# Patient Record
Sex: MALE | Race: WHITE
[De-identification: names, ages, dates, MRNs, and addresses within clinical notes are randomized per-mention and may not be internally consistent; named-entity substitution may affect disease eponyms.]

---

## 2017-06-06 ENCOUNTER — HOSPITAL (OUTPATIENT)
Dept: OTHER | Age: 47
End: 2017-06-06
Attending: INTERNAL MEDICINE

## 2017-10-04 ENCOUNTER — CHARTING TRANS (OUTPATIENT)
Dept: OTHER | Age: 47
End: 2017-10-04

## 2017-11-10 ENCOUNTER — CHARTING TRANS (OUTPATIENT)
Dept: OTHER | Age: 47
End: 2017-11-10

## 2017-12-01 ENCOUNTER — CHARTING TRANS (OUTPATIENT)
Dept: OTHER | Age: 47
End: 2017-12-01

## 2017-12-01 ENCOUNTER — LAB SERVICES (OUTPATIENT)
Dept: OTHER | Age: 47
End: 2017-12-01

## 2017-12-04 ENCOUNTER — CHARTING TRANS (OUTPATIENT)
Dept: OTHER | Age: 47
End: 2017-12-04

## 2017-12-04 LAB
ALBUMIN SERPL-MCNC: 4.1 G/DL (ref 3.6–5.1)
ALBUMIN/GLOB SERPL: 1.2 (ref 1–2.4)
ALP SERPL-CCNC: 49 UNITS/L (ref 45–117)
ALT SERPL-CCNC: 42 UNITS/L
ANION GAP SERPL CALC-SCNC: 11 MMOL/L (ref 10–20)
AST SERPL-CCNC: 24 UNITS/L
BASOPHILS # BLD: 0 K/MCL (ref 0–0.3)
BASOPHILS NFR BLD: 0 %
BILIRUB SERPL-MCNC: 0.3 MG/DL (ref 0.2–1)
BUN SERPL-MCNC: 19 MG/DL (ref 6–20)
BUN/CREAT SERPL: 19 (ref 7–25)
CALCIUM SERPL-MCNC: 9.4 MG/DL (ref 8.4–10.2)
CHLORIDE SERPL-SCNC: 105 MMOL/L (ref 98–107)
CHOLEST SERPL-MCNC: 176 MG/DL
CHOLEST/HDLC SERPL: 4.1
CO2 SERPL-SCNC: 32 MMOL/L (ref 21–32)
CREAT SERPL-MCNC: 0.98 MG/DL (ref 0.67–1.17)
DIFFERENTIAL METHOD BLD: NORMAL
EOSINOPHIL # BLD: 0.3 K/MCL (ref 0.1–0.5)
EOSINOPHIL NFR BLD: 3 %
ERYTHROCYTE [DISTWIDTH] IN BLOOD: 13.9 % (ref 11–15)
GLOBULIN SER-MCNC: 3.4 G/DL (ref 2–4)
GLUCOSE SERPL-MCNC: 97 MG/DL (ref 65–99)
HDLC SERPL-MCNC: 43 MG/DL
HEMATOCRIT: 45.5 % (ref 39–51)
HEMOGLOBIN: 14.6 G/DL (ref 13–17)
LDLC SERPL CALC-MCNC: 115 MG/DL
LENGTH OF FAST TIME PATIENT: NORMAL HRS
LENGTH OF FAST TIME PATIENT: NORMAL HRS
LYMPHOCYTES # BLD: 3.3 K/MCL (ref 1–4.8)
LYMPHOCYTES NFR BLD: 36 %
MEAN CORPUSCULAR HEMOGLOBIN: 30.6 PG (ref 26–34)
MEAN CORPUSCULAR HGB CONC: 32.1 G/DL (ref 32–36.5)
MEAN CORPUSCULAR VOLUME: 95.4 FL (ref 78–100)
MONOCYTES # BLD: 0.7 K/MCL (ref 0.3–0.9)
MONOCYTES NFR BLD: 8 %
NEUTROPHILS # BLD: 4.7 K/MCL (ref 1.8–7.7)
NEUTROPHILS NFR BLD: 53 %
NONHDLC SERPL-MCNC: 133 MG/DL
PLATELET COUNT: 343 K/MCL (ref 140–450)
POTASSIUM SERPL-SCNC: 4.1 MMOL/L (ref 3.4–5.1)
RED CELL COUNT: 4.77 MIL/MCL (ref 4.5–5.9)
SODIUM SERPL-SCNC: 144 MMOL/L (ref 135–145)
TOTAL PROTEIN: 7.5 G/DL (ref 6.4–8.2)
TRIGL SERPL-MCNC: 89 MG/DL
TSH SERPL-ACNC: 1.61 MCUNITS/ML (ref 0.35–5)
WHITE BLOOD COUNT: 9 K/MCL (ref 4.2–11)

## 2018-05-23 ENCOUNTER — CHARTING TRANS (OUTPATIENT)
Dept: OTHER | Age: 48
End: 2018-05-23

## 2018-08-09 ENCOUNTER — CHARTING TRANS (OUTPATIENT)
Dept: OTHER | Age: 48
End: 2018-08-09

## 2018-08-15 ENCOUNTER — LAB SERVICES (OUTPATIENT)
Dept: OTHER | Age: 48
End: 2018-08-15

## 2018-08-15 ENCOUNTER — CHARTING TRANS (OUTPATIENT)
Dept: OTHER | Age: 48
End: 2018-08-15

## 2018-08-16 LAB
ALBUMIN SERPL-MCNC: 4.3 G/DL (ref 3.6–5.1)
ALBUMIN/GLOB SERPL: 1.4 (ref 1–2.4)
ALP SERPL-CCNC: 49 UNITS/L (ref 45–117)
ALT SERPL-CCNC: 40 UNITS/L
ANION GAP SERPL CALC-SCNC: 13 MMOL/L (ref 10–20)
AST SERPL-CCNC: 20 UNITS/L
BASOPHILS # BLD: 0 K/MCL (ref 0–0.3)
BASOPHILS NFR BLD: 0 %
BILIRUB SERPL-MCNC: 0.4 MG/DL (ref 0.2–1)
BUN SERPL-MCNC: 13 MG/DL (ref 6–20)
BUN/CREAT SERPL: 15 (ref 7–25)
CALCIUM SERPL-MCNC: 9.2 MG/DL (ref 8.4–10.2)
CHLORIDE SERPL-SCNC: 107 MMOL/L (ref 98–107)
CHOLEST SERPL-MCNC: 208 MG/DL
CHOLEST/HDLC SERPL: 5.9
CO2 SERPL-SCNC: 29 MMOL/L (ref 21–32)
CREAT SERPL-MCNC: 0.85 MG/DL (ref 0.67–1.17)
DIFFERENTIAL METHOD BLD: ABNORMAL
EOSINOPHIL # BLD: 0.2 K/MCL (ref 0.1–0.5)
EOSINOPHIL NFR BLD: 3 %
ERYTHROCYTE [DISTWIDTH] IN BLOOD: 14.1 % (ref 11–15)
GLOBULIN SER-MCNC: 3 G/DL (ref 2–4)
GLUCOSE SERPL-MCNC: 94 MG/DL (ref 65–99)
HDLC SERPL-MCNC: 35 MG/DL
HEMATOCRIT: 44.3 % (ref 39–51)
HEMOGLOBIN: 14.2 G/DL (ref 13–17)
IMM GRANULOCYTES # BLD AUTO: 0 K/MCL (ref 0–0.2)
IMM GRANULOCYTES NFR BLD: 0 %
LDLC SERPL CALC-MCNC: 126 MG/DL
LENGTH OF FAST TIME PATIENT: ABNORMAL HRS
LENGTH OF FAST TIME PATIENT: ABNORMAL HRS
LYMPHOCYTES # BLD: 2.8 K/MCL (ref 1–4.8)
LYMPHOCYTES NFR BLD: 41 %
MEAN CORPUSCULAR HEMOGLOBIN: 30.5 PG (ref 26–34)
MEAN CORPUSCULAR HGB CONC: 32.1 G/DL (ref 32–36.5)
MEAN CORPUSCULAR VOLUME: 95.3 FL (ref 78–100)
MONOCYTES # BLD: 0.7 K/MCL (ref 0.3–0.9)
MONOCYTES NFR BLD: 10 %
NEUTROPHILS # BLD: 3 K/MCL (ref 1.8–7.7)
NEUTROPHILS NFR BLD: 46 %
NONHDLC SERPL-MCNC: 173 MG/DL
NRBC (NRBCRE): 0 /100 WBC
PLATELET COUNT: 349 K/MCL (ref 140–450)
POTASSIUM SERPL-SCNC: 4.7 MMOL/L (ref 3.4–5.1)
PSA SERPL-MCNC: 1.06 NG/ML
RED CELL COUNT: 4.65 MIL/MCL (ref 4.5–5.9)
SODIUM SERPL-SCNC: 144 MMOL/L (ref 135–145)
TOTAL PROTEIN: 7.3 G/DL (ref 6.4–8.2)
TRIGL SERPL-MCNC: 236 MG/DL
TSH SERPL-ACNC: 1.88 MCUNITS/ML (ref 0.35–5)
WHITE BLOOD COUNT: 6.7 K/MCL (ref 4.2–11)

## 2018-10-31 VITALS
TEMPERATURE: 98.5 F | WEIGHT: 266 LBS | HEART RATE: 85 BPM | SYSTOLIC BLOOD PRESSURE: 138 MMHG | OXYGEN SATURATION: 95 % | HEIGHT: 68 IN | DIASTOLIC BLOOD PRESSURE: 82 MMHG | BODY MASS INDEX: 40.32 KG/M2

## 2018-11-01 VITALS
SYSTOLIC BLOOD PRESSURE: 137 MMHG | WEIGHT: 260.25 LBS | TEMPERATURE: 98.1 F | HEIGHT: 68 IN | HEART RATE: 72 BPM | DIASTOLIC BLOOD PRESSURE: 102 MMHG | OXYGEN SATURATION: 97 % | BODY MASS INDEX: 39.44 KG/M2

## 2018-11-02 VITALS
DIASTOLIC BLOOD PRESSURE: 84 MMHG | HEART RATE: 69 BPM | OXYGEN SATURATION: 95 % | BODY MASS INDEX: 38.06 KG/M2 | HEIGHT: 68 IN | SYSTOLIC BLOOD PRESSURE: 128 MMHG | WEIGHT: 251.1 LBS | TEMPERATURE: 98.1 F

## 2018-11-02 VITALS
WEIGHT: 248.24 LBS | SYSTOLIC BLOOD PRESSURE: 142 MMHG | OXYGEN SATURATION: 99 % | TEMPERATURE: 98.3 F | HEART RATE: 71 BPM | DIASTOLIC BLOOD PRESSURE: 91 MMHG

## 2018-11-02 VITALS
RESPIRATION RATE: 16 BRPM | DIASTOLIC BLOOD PRESSURE: 89 MMHG | SYSTOLIC BLOOD PRESSURE: 136 MMHG | HEIGHT: 68 IN | WEIGHT: 248.57 LBS | TEMPERATURE: 98.1 F | BODY MASS INDEX: 37.67 KG/M2 | HEART RATE: 62 BPM

## 2018-11-07 ENCOUNTER — CHARTING TRANS (OUTPATIENT)
Dept: OTHER | Age: 48
End: 2018-11-07

## 2018-11-07 ENCOUNTER — MYAURORA ACCOUNT LINK (OUTPATIENT)
Dept: OTHER | Age: 48
End: 2018-11-07

## 2018-11-27 VITALS
WEIGHT: 275 LBS | SYSTOLIC BLOOD PRESSURE: 124 MMHG | HEIGHT: 68 IN | TEMPERATURE: 98 F | HEART RATE: 68 BPM | DIASTOLIC BLOOD PRESSURE: 75 MMHG | BODY MASS INDEX: 41.68 KG/M2 | OXYGEN SATURATION: 98 %

## 2019-01-15 RX ORDER — BUPROPION HYDROCHLORIDE 300 MG/1
TABLET ORAL
Qty: 90 TABLET | Refills: 1 | Status: SHIPPED | OUTPATIENT
Start: 2019-01-15 | End: 2019-07-08 | Stop reason: SDUPTHER

## 2019-04-24 RX ORDER — OMEPRAZOLE 40 MG/1
CAPSULE, DELAYED RELEASE ORAL
Qty: 90 CAPSULE | Refills: 2 | Status: SHIPPED | OUTPATIENT
Start: 2019-04-24

## 2019-05-10 RX ORDER — DULOXETIN HYDROCHLORIDE 60 MG/1
CAPSULE, DELAYED RELEASE ORAL
Qty: 90 CAPSULE | Refills: 3 | Status: SHIPPED | OUTPATIENT
Start: 2019-05-10

## 2019-06-18 ENCOUNTER — HOSPITAL (OUTPATIENT)
Dept: OTHER | Age: 49
End: 2019-06-18
Attending: ORTHOPAEDIC SURGERY

## 2019-07-08 RX ORDER — BUPROPION HYDROCHLORIDE 300 MG/1
TABLET ORAL
Qty: 90 TABLET | Refills: 1 | Status: SHIPPED | OUTPATIENT
Start: 2019-07-08 | End: 2020-01-09 | Stop reason: SDUPTHER

## 2019-07-15 RX ORDER — GABAPENTIN 600 MG/1
TABLET ORAL
Qty: 90 TABLET | Refills: 3 | Status: SHIPPED | OUTPATIENT
Start: 2019-07-15 | End: 2020-07-07

## 2019-07-15 RX ORDER — LISINOPRIL 40 MG/1
TABLET ORAL
Qty: 90 TABLET | Refills: 3 | Status: SHIPPED | OUTPATIENT
Start: 2019-07-15 | End: 2020-07-07

## 2019-07-22 RX ORDER — AMLODIPINE BESYLATE 10 MG/1
TABLET ORAL
Qty: 90 TABLET | Refills: 3 | Status: SHIPPED | OUTPATIENT
Start: 2019-07-22

## 2019-07-23 RX ORDER — ROSUVASTATIN CALCIUM 5 MG/1
TABLET, COATED ORAL
Qty: 90 TABLET | Refills: 3 | Status: SHIPPED | OUTPATIENT
Start: 2019-07-23

## 2019-07-26 PROCEDURE — 93351 STRESS TTE COMPLETE: CPT | Performed by: INTERNAL MEDICINE

## 2019-08-28 RX ORDER — CARVEDILOL 12.5 MG/1
TABLET ORAL
Qty: 180 TABLET | Refills: 3 | Status: SHIPPED | OUTPATIENT
Start: 2019-08-28

## 2019-09-11 ENCOUNTER — HOSPITAL (OUTPATIENT)
Dept: OTHER | Age: 49
End: 2019-09-11
Attending: INTERNAL MEDICINE

## 2019-09-16 RX ORDER — HYDROCHLOROTHIAZIDE 25 MG/1
TABLET ORAL
Qty: 90 TABLET | Refills: 2 | Status: SHIPPED | OUTPATIENT
Start: 2019-09-16

## 2020-01-09 RX ORDER — BUPROPION HYDROCHLORIDE 300 MG/1
TABLET ORAL
Qty: 90 TABLET | Refills: 1 | Status: SHIPPED | OUTPATIENT
Start: 2020-01-09

## 2020-01-10 RX ORDER — OMEPRAZOLE 40 MG/1
CAPSULE, DELAYED RELEASE ORAL
Qty: 90 CAPSULE | Refills: 2 | OUTPATIENT
Start: 2020-01-10

## 2020-05-12 RX ORDER — DULOXETIN HYDROCHLORIDE 60 MG/1
CAPSULE, DELAYED RELEASE ORAL
Qty: 90 CAPSULE | Refills: 3 | OUTPATIENT
Start: 2020-05-12

## 2020-06-12 RX ORDER — HYDROCHLOROTHIAZIDE 25 MG/1
TABLET ORAL
Qty: 90 TABLET | Refills: 2 | OUTPATIENT
Start: 2020-06-12

## 2020-07-07 RX ORDER — GABAPENTIN 600 MG/1
TABLET ORAL
Qty: 90 TABLET | Refills: 3 | Status: SHIPPED | OUTPATIENT
Start: 2020-07-07

## 2020-07-07 RX ORDER — LISINOPRIL 40 MG/1
TABLET ORAL
Qty: 30 TABLET | Refills: 0 | Status: SHIPPED | OUTPATIENT
Start: 2020-07-07

## 2020-07-08 RX ORDER — BUPROPION HYDROCHLORIDE 300 MG/1
TABLET ORAL
Qty: 90 TABLET | Refills: 1 | OUTPATIENT
Start: 2020-07-08

## 2021-12-06 RX ORDER — GABAPENTIN 600 MG/1
600 TABLET ORAL DAILY
Qty: 90 TABLET | Refills: 3 | OUTPATIENT
Start: 2021-12-06

## 2024-03-13 ENCOUNTER — HOSPITAL ENCOUNTER (OUTPATIENT)
Dept: ULTRASOUND IMAGING | Age: 54
Discharge: HOME | End: 2024-03-13
Attending: INTERNAL MEDICINE
Payer: COMMERCIAL

## 2024-03-13 ENCOUNTER — OFFICE VISIT (OUTPATIENT)
Dept: ENDOCRINOLOGY CLINIC | Facility: CLINIC | Age: 54
End: 2024-03-13

## 2024-03-13 VITALS
WEIGHT: 275 LBS | SYSTOLIC BLOOD PRESSURE: 111 MMHG | HEART RATE: 69 BPM | DIASTOLIC BLOOD PRESSURE: 68 MMHG | HEIGHT: 69 IN | BODY MASS INDEX: 40.73 KG/M2

## 2024-03-13 DIAGNOSIS — Z86.39 HISTORY OF HYPOPARATHYROIDISM: ICD-10-CM

## 2024-03-13 DIAGNOSIS — E29.1 HYPOGONADISM IN MALE: ICD-10-CM

## 2024-03-13 DIAGNOSIS — E78.5 DYSLIPIDEMIA: ICD-10-CM

## 2024-03-13 DIAGNOSIS — R53.83 FATIGUE, UNSPECIFIED TYPE: ICD-10-CM

## 2024-03-13 DIAGNOSIS — E89.0 H/O PARTIAL THYROIDECTOMY: ICD-10-CM

## 2024-03-13 DIAGNOSIS — E89.0 H/O PARTIAL THYROIDECTOMY: Primary | ICD-10-CM

## 2024-03-13 DIAGNOSIS — R73.03 PRE-DIABETES: ICD-10-CM

## 2024-03-13 PROCEDURE — 99244 OFF/OP CNSLTJ NEW/EST MOD 40: CPT | Performed by: INTERNAL MEDICINE

## 2024-03-13 PROCEDURE — 76536 US EXAM OF HEAD AND NECK: CPT | Performed by: INTERNAL MEDICINE

## 2024-03-13 PROCEDURE — 3078F DIAST BP <80 MM HG: CPT | Performed by: INTERNAL MEDICINE

## 2024-03-13 PROCEDURE — 3008F BODY MASS INDEX DOCD: CPT | Performed by: INTERNAL MEDICINE

## 2024-03-13 PROCEDURE — 3074F SYST BP LT 130 MM HG: CPT | Performed by: INTERNAL MEDICINE

## 2024-03-13 RX ORDER — TESTOSTERONE 20.25 MG/1.25G
60.75 GEL TOPICAL
Qty: 5 EACH | Refills: 1 | Status: SHIPPED | OUTPATIENT
Start: 2024-03-13 | End: 2024-06-11

## 2024-03-13 RX ORDER — TESTOSTERONE 16.2 MG/G
3 GEL TRANSDERMAL DAILY
COMMUNITY
End: 2024-03-13

## 2024-03-13 NOTE — H&P
New Patient Evaluation - History and Physical    CONSULT - Reason for Visit:  Hypogonadism.  Requesting Physician: Self-Referral.    CHIEF COMPLAINT:    Chief Complaint   Patient presents with    Consult      Est care Testosterone, and weight management, last labs 6/2023         HISTORY OF PRESENT ILLNESS:   Srinivasa Andrade is a 53 year old male who presents with establishment of care for hypogonadism. He is currently taking 3 pumps of testosterone gel.     He still feels very tired and is concerned that the hypogonadism is not being treated appropriately. He thinks that this was diagnosed many years ago. He has 1 biological child who is 17 years old. He does not remember suffering any head trauma.     He does not remember having any pituitary labs checked.     He is also concerned about his weight. He states that he had been able to lose weight by eating healthy and exercising. He also states that his wife and son are not following the same eating habits so it is hard to eat this way long term.     He also has dyslipidemia for which he is taking treatment.     He also has a of multinodular goiter with history of partial thyroidectomy.      PAST MEDICAL HISTORY:   No past medical history on file.    PAST SURGICAL HISTORY:   No past surgical history on file.    SOCIAL HISTORY:    Social History     Socioeconomic History    Marital status:        FAMILY HISTORY:   No family history on file.    CURRENT MEDICATIONS:    Current Outpatient Medications   Medication Sig Dispense Refill    Testosterone 20.25 MG/ACT (1.62%) Transdermal Gel Apply 3 Pump topically daily.         ALLERGIES:  Not on File      ASSESSMENTS:     REVIEW OF SYSTEMS:  Constitutional: Negative for: weight change, fever, fatigue, cold/heat intolerance  Eyes: Negative for:  Visual changes, proptosis, blurring  ENT: Negative for:  dysphagia, neck swelling, dysphonia  Respiratory: Negative for:  dyspnea, cough  Cardiovascular: Negative for:  chest  pain, palpitations, orthopnea  GI: Negative for:  abdominal pain, nausea, vomiting, diarrhea, constipation, bleeding  Neurology: Negative for: headache, numbness, weakness  Genito-Urinary: Negative for: dysuria, frequency  Psychiatric: Negative for:  depression, anxiety  Hematology/Lymphatics: Negative for: bruising, lower extremity edema  Endocrine: Negative for: polyuria, polydypsia  Skin: Negative for: rash, blister, cellulitis,      PHYSICAL EXAM:   Vitals:    03/13/24 0956   BP: 111/68   Pulse: 69   Weight: 275 lb (124.7 kg)   Height: 5' 9\" (1.753 m)     BMI: Body mass index is 40.61 kg/m².     CONSTITUTIONAL:  awake, alert, cooperative, no apparent distress, and appears stated age  PSYCH: normal affect  EYES:  No proptosis, no ptosis, conjunctiva normal  ENT:  Normocephalic, atraumatic  NECK:  Supple, symmetrical, trachea midline, no adenopathy, thyroid symmetric, not enlarged and no tenderness  SKIN:  no bruising or bleeding, no rashes and no lesions, with mild acanthosis nigricans  EXTREMITIES: no edema      DATA:   Reviewed    ASSESSMENT AND PLAN: This is a 53 year-old man here for establishment of care of hypogonadism of unknown cause, obesity, pre-diabetes, dyslipidemia and vitamin D deficiency. He states that even though he is taking testosterone, this has not been helping him. I would like to recheck his testosterone and also evaluate him for his pituitary function, as well as other causes for fatigue.     I would also like to evaluate him for parameters that may adversely influence his weight, recheck lipid panel, and hemoglobin A1c.     We will also check thyroid US.    I will follow up with patient once test results are back.    Prior to this encounter, I spent over 20 minutes with preparing for the visit, reviewing documents from other specialties as well as from PCP, and going over test results and imaging studies. During the face to face encounter, I spent an additional 30 minutes which were  determined for history-taking, physical examination, and orientation regarding our services. Greater than 50% of the time was spent in counseling, anticipatory guidance, and coordination of care. Patient concerns were answered to the best of my knowledge.         3/13/2024  Marlen Matta MD

## 2024-04-09 ENCOUNTER — LAB ENCOUNTER (OUTPATIENT)
Dept: LAB | Facility: REFERENCE LAB | Age: 54
End: 2024-04-09
Attending: INTERNAL MEDICINE
Payer: COMMERCIAL

## 2024-04-09 DIAGNOSIS — E29.1 HYPOGONADISM IN MALE: ICD-10-CM

## 2024-04-09 DIAGNOSIS — R53.83 FATIGUE, UNSPECIFIED TYPE: ICD-10-CM

## 2024-04-09 DIAGNOSIS — E78.5 DYSLIPIDEMIA: ICD-10-CM

## 2024-04-09 DIAGNOSIS — E89.0 H/O PARTIAL THYROIDECTOMY: ICD-10-CM

## 2024-04-09 DIAGNOSIS — R73.03 PRE-DIABETES: ICD-10-CM

## 2024-04-09 DIAGNOSIS — Z86.39 HISTORY OF HYPOPARATHYROIDISM: ICD-10-CM

## 2024-04-09 LAB
ALBUMIN SERPL-MCNC: 4.8 G/DL (ref 3.2–4.8)
ALBUMIN/GLOB SERPL: 1.7 {RATIO} (ref 1–2)
ALP LIVER SERPL-CCNC: 59 U/L
ALT SERPL-CCNC: 33 U/L
ANION GAP SERPL CALC-SCNC: 7 MMOL/L (ref 0–18)
AST SERPL-CCNC: 24 U/L (ref ?–34)
BASOPHILS # BLD AUTO: 0.05 X10(3) UL (ref 0–0.2)
BASOPHILS NFR BLD AUTO: 0.6 %
BILIRUB SERPL-MCNC: 0.6 MG/DL (ref 0.3–1.2)
BUN BLD-MCNC: 18 MG/DL (ref 9–23)
BUN/CREAT SERPL: 16.8 (ref 10–20)
CALCIUM BLD-MCNC: 9.6 MG/DL (ref 8.7–10.4)
CHLORIDE SERPL-SCNC: 107 MMOL/L (ref 98–112)
CHOLEST SERPL-MCNC: 205 MG/DL (ref ?–200)
CO2 SERPL-SCNC: 29 MMOL/L (ref 21–32)
CORTIS SERPL-MCNC: 14.2 UG/DL
CREAT BLD-MCNC: 1.07 MG/DL
DEPRECATED RDW RBC AUTO: 46.7 FL (ref 35.1–46.3)
EGFRCR SERPLBLD CKD-EPI 2021: 82 ML/MIN/1.73M2 (ref 60–?)
EOSINOPHIL # BLD AUTO: 0.29 X10(3) UL (ref 0–0.7)
EOSINOPHIL NFR BLD AUTO: 3.5 %
ERYTHROCYTE [DISTWIDTH] IN BLOOD BY AUTOMATED COUNT: 14.2 % (ref 11–15)
EST. AVERAGE GLUCOSE BLD GHB EST-MCNC: 120 MG/DL (ref 68–126)
FASTING PATIENT LIPID ANSWER: YES
FASTING STATUS PATIENT QL REPORTED: YES
FSH SERPL-ACNC: 5.7 MIU/ML
GLOBULIN PLAS-MCNC: 2.9 G/DL (ref 2.8–4.4)
GLUCOSE BLD-MCNC: 111 MG/DL (ref 70–99)
HBA1C MFR BLD: 5.8 % (ref ?–5.7)
HCT VFR BLD AUTO: 44.5 %
HDLC SERPL-MCNC: 39 MG/DL (ref 40–59)
HGB BLD-MCNC: 15.5 G/DL
IMM GRANULOCYTES # BLD AUTO: 0.02 X10(3) UL (ref 0–1)
IMM GRANULOCYTES NFR BLD: 0.2 %
INSULIN SERPL-ACNC: 21.5 MU/L (ref 3–25)
LDLC SERPL CALC-MCNC: 129 MG/DL (ref ?–100)
LH SERPL-ACNC: 4.3 MIU/ML
LYMPHOCYTES # BLD AUTO: 3.66 X10(3) UL (ref 1–4)
LYMPHOCYTES NFR BLD AUTO: 43.8 %
MCH RBC QN AUTO: 31.4 PG (ref 26–34)
MCHC RBC AUTO-ENTMCNC: 34.8 G/DL (ref 31–37)
MCV RBC AUTO: 90.1 FL
MONOCYTES # BLD AUTO: 0.71 X10(3) UL (ref 0.1–1)
MONOCYTES NFR BLD AUTO: 8.5 %
NEUTROPHILS # BLD AUTO: 3.62 X10 (3) UL (ref 1.5–7.7)
NEUTROPHILS # BLD AUTO: 3.62 X10(3) UL (ref 1.5–7.7)
NEUTROPHILS NFR BLD AUTO: 43.4 %
NONHDLC SERPL-MCNC: 166 MG/DL (ref ?–130)
OSMOLALITY SERPL CALC.SUM OF ELEC: 299 MOSM/KG (ref 275–295)
PLATELET # BLD AUTO: 387 10(3)UL (ref 150–450)
POTASSIUM SERPL-SCNC: 3.7 MMOL/L (ref 3.5–5.1)
PROLACTIN SERPL-MCNC: 8.5 NG/ML
PROT SERPL-MCNC: 7.7 G/DL (ref 5.7–8.2)
PTH-INTACT SERPL-MCNC: 94.5 PG/ML (ref 18.5–88)
RBC # BLD AUTO: 4.94 X10(6)UL
SODIUM SERPL-SCNC: 143 MMOL/L (ref 136–145)
T3FREE SERPL-MCNC: 2.43 PG/ML (ref 2.4–4.2)
T4 FREE SERPL-MCNC: 1.1 NG/DL (ref 0.8–1.7)
TRIGL SERPL-MCNC: 205 MG/DL (ref 30–149)
TSI SER-ACNC: 2.33 MIU/ML (ref 0.55–4.78)
VIT B12 SERPL-MCNC: 443 PG/ML (ref 211–911)
VIT D+METAB SERPL-MCNC: 39.3 NG/ML (ref 30–100)
VLDLC SERPL CALC-MCNC: 37 MG/DL (ref 0–30)
WBC # BLD AUTO: 8.4 X10(3) UL (ref 4–11)

## 2024-04-09 PROCEDURE — 80061 LIPID PANEL: CPT

## 2024-04-09 PROCEDURE — 85025 COMPLETE CBC W/AUTO DIFF WBC: CPT

## 2024-04-09 PROCEDURE — 80053 COMPREHEN METABOLIC PANEL: CPT

## 2024-04-09 PROCEDURE — 82607 VITAMIN B-12: CPT

## 2024-04-09 PROCEDURE — 84146 ASSAY OF PROLACTIN: CPT

## 2024-04-09 PROCEDURE — 83525 ASSAY OF INSULIN: CPT

## 2024-04-09 PROCEDURE — 82306 VITAMIN D 25 HYDROXY: CPT

## 2024-04-09 PROCEDURE — 82024 ASSAY OF ACTH: CPT

## 2024-04-09 PROCEDURE — 83002 ASSAY OF GONADOTROPIN (LH): CPT

## 2024-04-09 PROCEDURE — 84443 ASSAY THYROID STIM HORMONE: CPT

## 2024-04-09 PROCEDURE — 83036 HEMOGLOBIN GLYCOSYLATED A1C: CPT

## 2024-04-09 PROCEDURE — 84439 ASSAY OF FREE THYROXINE: CPT

## 2024-04-09 PROCEDURE — 84305 ASSAY OF SOMATOMEDIN: CPT

## 2024-04-09 PROCEDURE — 83001 ASSAY OF GONADOTROPIN (FSH): CPT

## 2024-04-09 PROCEDURE — 84410 TESTOSTERONE BIOAVAILABLE: CPT

## 2024-04-09 PROCEDURE — 83970 ASSAY OF PARATHORMONE: CPT

## 2024-04-09 PROCEDURE — 36415 COLL VENOUS BLD VENIPUNCTURE: CPT

## 2024-04-09 PROCEDURE — 84481 FREE ASSAY (FT-3): CPT

## 2024-04-09 PROCEDURE — 82533 TOTAL CORTISOL: CPT

## 2024-04-10 LAB — ACTH: 14.1 PG/ML

## 2024-04-11 LAB
IGF I: 196 NG/ML
IGF-1, Z SCORE: 0.9 S.D.

## 2024-04-12 LAB
SEX HORM BIND GLOB: 25.9 NMOL/L
TESTOST % FREE+WEAK BND: 28.5 %
TESTOST FREE+WEAK BND: 82.1 NG/DL
TESTOSTERONE TOT /MS: 288 NG/DL

## 2024-04-15 NOTE — PROGRESS NOTES
Follow-up- Reason for Visit:  Hypogonadism.  Requesting Physician: Self-Referral.    CHIEF COMPLAINT:    Chief Complaint   Patient presents with    Thyroid Problem     Follow up labs, ultrasound        HISTORY OF PRESENT ILLNESS:   Srinivasa Andrade is a 53 year old male who presents for follow-up of hypogonadism. He is currently taking 3 pumps of testosterone gel.     At the last initial visit, he was feeling very tired and was concerned that the hypogonadism was not being treated appropriately. He thinks that this was diagnosed many years ago. He has 1 biological child who is 17 years old. He does not remember suffering any head trauma.     He did not remember having any pituitary labs checked.     He is also concerned about his weight. He states that he had been able to lose weight by eating healthy and exercising. He also states that his wife and son are not following the same eating habits so it is hard to eat this way long term.     He also has dyslipidemia for which he is taking treatment (rosuvastatin).    He also has a of multinodular goiter with history of partial thyroidectomy in 2007 and 2008. He also has had a parathyroidectomy.     I explained to him at that visit, that we would complete a full evaluation and then make recommendations based upon the test results.     PAST MEDICAL HISTORY:   No past medical history on file.    PAST SURGICAL HISTORY:   No past surgical history on file.    SOCIAL HISTORY:    Social History     Socioeconomic History    Marital status:        FAMILY HISTORY:   No family history on file.    CURRENT MEDICATIONS:    Current Outpatient Medications   Medication Sig Dispense Refill    Testosterone 20.25 MG/1.25GM (1.62%) Transdermal Gel Place 60.75 mg onto the skin once daily. 5 each 1       ALLERGIES:  Not on File      ASSESSMENTS:     REVIEW OF SYSTEMS:  Constitutional: Negative for: weight change, fever, fatigue, cold/heat intolerance  Eyes: Negative for:  Visual changes,  proptosis, blurring  ENT: Negative for:  dysphagia, neck swelling, dysphonia  Respiratory: Negative for:  dyspnea, cough  Cardiovascular: Negative for:  chest pain, palpitations, orthopnea  GI: Negative for:  abdominal pain, nausea, vomiting, diarrhea, constipation, bleeding  Neurology: Negative for: headache, numbness, weakness  Genito-Urinary: Negative for: dysuria, frequency  Psychiatric: Negative for:  depression, anxiety  Hematology/Lymphatics: Negative for: bruising, lower extremity edema  Endocrine: Negative for: polyuria, polydypsia  Skin: Negative for: rash, blister, cellulitis,      PHYSICAL EXAM:   Vitals:    04/16/24 0924   BP: 114/65   Pulse: 72   Weight: 283 lb 9.6 oz (128.6 kg)   Height: 5' 9\" (1.753 m)       BMI: Body mass index is 41.88 kg/m².     CONSTITUTIONAL:  awake, alert, cooperative, no apparent distress, and appears stated age  PSYCH: normal affect  EYES:  No proptosis, no ptosis, conjunctiva normal  ENT:  Normocephalic, atraumatic  NECK:  Supple, symmetrical, trachea midline, no adenopathy, thyroid symmetric, not enlarged and no tenderness  SKIN:  no bruising or bleeding, no rashes and no lesions, with mild acanthosis nigricans  EXTREMITIES: no edema      DATA:   Reviewed    Thyroid US- 3/13/24:  PROCEDURE: US THYROID (CPT= 53025)     COMPARISON: None.     INDICATIONS: H/O partial thyroidectomy     TECHNIQUE: High-resolution ultrasound was performed of the thyroid gland.     FINDINGS:  RIGHT LOBE: Right lobe measures 54 x 27 x 21 mm (14.3 mL).  Heterogeneous echotexture.  In the posterior lower pole there is a 10 x 8 x 8 mm solid hypoechoic nodule (TR 4).     LEFT LOBE: Post partial thyroidectomy.  Unremarkable left thyroid bed.     ISTHMUS: Isthmus measures 7 mm in anterior posterior diameter.  Heterogeneous echotexture without nodule.     OTHER: No masses or adenopathy.                 Impression   CONCLUSION:  1.  Post resection of the left lobe of the thyroid.  No suspicious soft tissue  in the left thyroid bed.  2.  Heterogeneous appearance of the thyroid parenchyma compatible with a nonspecific thyroiditis.  3.  10 mm TIRADS 4 nodule in the right lobe.  Given its current size recommend follow-up ultrasound in 12 months.        Dictated by (CST): Jose Panchal MD on 3/13/2024 at 2:38 PM      Finalized by (CST): Jose Panchal MD on 3/13/2024 at 2:39 PM             ASSESSMENT AND PLAN: This is a 53 year-old man here for follow-up of hypogonadism of unknown cause, obesity, pre-diabetes, dyslipidemia and vitamin D deficiency.     1.) Hypogonadism  - Stop transdermal testosterone   - Start IM testosterone, 150mg every 2 weeks for 3 months  - We will check testosterone, CBC with diff and PSA after 3 months    2.) Vitamin Levels  - Continue Vitamin D  - Start taking Vitamin B12 500mcg daily    3.) Secondary Hyperparathyroidism  - Could be from low calcium  - He should start taking calcium citrate + D once a day  - We will recheck in 3 months    4.) Dyslipidemia  - We will change rosuvastatin to atorvastatin   - Recheck Lipid panel in 3 months    5.) Obesity and increased cravings and pre-diabetes  - Start phentermine  - Start Ozempic 0.25mg qweekly  - Check HbA1c in 3 months    Return in 3 months    Prior to this encounter, I spent over 15 minutes with preparing for the visit, including reviewing documents from other specialties as well as from PCP and going over test results and imaging studies. During the face to face encounter, I spent an additional 15 minutes which were determined for follow-up. Greater than 50% of the time was spent in counseling, anticipatory guidance, and coordination of care. Patient concerns were answered to the best of my knowledge.         4/16/24  Marlen Matta MD

## 2024-04-16 ENCOUNTER — OFFICE VISIT (OUTPATIENT)
Dept: ENDOCRINOLOGY CLINIC | Facility: CLINIC | Age: 54
End: 2024-04-16
Payer: COMMERCIAL

## 2024-04-16 VITALS
SYSTOLIC BLOOD PRESSURE: 114 MMHG | BODY MASS INDEX: 42.01 KG/M2 | DIASTOLIC BLOOD PRESSURE: 65 MMHG | WEIGHT: 283.63 LBS | HEART RATE: 72 BPM | HEIGHT: 69 IN

## 2024-04-16 DIAGNOSIS — E29.1 HYPOGONADISM IN MALE: Primary | ICD-10-CM

## 2024-04-16 DIAGNOSIS — E78.5 DYSLIPIDEMIA: ICD-10-CM

## 2024-04-16 DIAGNOSIS — E53.8 VITAMIN B12 DEFICIENCY: ICD-10-CM

## 2024-04-16 DIAGNOSIS — E88.819 INSULIN RESISTANCE: ICD-10-CM

## 2024-04-16 DIAGNOSIS — N25.81 SECONDARY HYPERPARATHYROIDISM (HCC): ICD-10-CM

## 2024-04-16 DIAGNOSIS — Z86.39 HISTORY OF HYPOPARATHYROIDISM: ICD-10-CM

## 2024-04-16 DIAGNOSIS — E66.01 CLASS 3 SEVERE OBESITY DUE TO EXCESS CALORIES WITH SERIOUS COMORBIDITY AND BODY MASS INDEX (BMI) OF 40.0 TO 44.9 IN ADULT (HCC): ICD-10-CM

## 2024-04-16 PROCEDURE — 96372 THER/PROPH/DIAG INJ SC/IM: CPT | Performed by: INTERNAL MEDICINE

## 2024-04-16 PROCEDURE — 99214 OFFICE O/P EST MOD 30 MIN: CPT | Performed by: INTERNAL MEDICINE

## 2024-04-16 PROCEDURE — 3008F BODY MASS INDEX DOCD: CPT | Performed by: INTERNAL MEDICINE

## 2024-04-16 PROCEDURE — 3078F DIAST BP <80 MM HG: CPT | Performed by: INTERNAL MEDICINE

## 2024-04-16 PROCEDURE — 3074F SYST BP LT 130 MM HG: CPT | Performed by: INTERNAL MEDICINE

## 2024-04-16 RX ORDER — TESTOSTERONE CYPIONATE 200 MG/ML
150 INJECTION, SOLUTION INTRAMUSCULAR ONCE
Status: COMPLETED | OUTPATIENT
Start: 2024-04-16 | End: 2024-04-16

## 2024-04-16 RX ADMIN — TESTOSTERONE CYPIONATE 150 MG: 200 INJECTION, SOLUTION INTRAMUSCULAR at 10:03:00

## 2024-04-16 NOTE — PROGRESS NOTES
Patient was at clinic for OV with Dr. Matta - Dr. Matta would like patient to transition to testosterone injections - wife will administer  Dose of 150mg testosterone drawn up - administration reviewed with patient and wife - wife stated understanding and gave injection to patient's left deltiod  Patient tolerated well  All questions answered and patient and wife stated understanding  Patient's wife will administer next injection in 2 weeks   Patient left in stable condition

## 2024-04-17 PROBLEM — E53.8 VITAMIN B12 DEFICIENCY: Status: ACTIVE | Noted: 2024-04-17

## 2024-04-17 PROBLEM — N25.81 SECONDARY HYPERPARATHYROIDISM (HCC): Status: ACTIVE | Noted: 2024-04-17

## 2024-04-17 PROBLEM — E88.819 INSULIN RESISTANCE: Status: ACTIVE | Noted: 2024-04-17

## 2024-04-17 PROBLEM — E66.813 CLASS 3 SEVERE OBESITY DUE TO EXCESS CALORIES WITH SERIOUS COMORBIDITY AND BODY MASS INDEX (BMI) OF 40.0 TO 44.9 IN ADULT (HCC): Status: ACTIVE | Noted: 2024-04-17

## 2024-04-17 PROBLEM — E66.813 CLASS 3 SEVERE OBESITY DUE TO EXCESS CALORIES WITH SERIOUS COMORBIDITY AND BODY MASS INDEX (BMI) OF 40.0 TO 44.9 IN ADULT: Status: ACTIVE | Noted: 2024-04-17

## 2024-04-17 PROBLEM — E66.01 CLASS 3 SEVERE OBESITY DUE TO EXCESS CALORIES WITH SERIOUS COMORBIDITY AND BODY MASS INDEX (BMI) OF 40.0 TO 44.9 IN ADULT (HCC): Status: ACTIVE | Noted: 2024-04-17

## 2024-04-17 RX ORDER — PHENTERMINE HYDROCHLORIDE 37.5 MG/1
18.75 TABLET ORAL
Qty: 15 TABLET | Refills: 0 | Status: SHIPPED | OUTPATIENT
Start: 2024-04-17 | End: 2024-05-17

## 2024-04-17 RX ORDER — ATORVASTATIN CALCIUM 20 MG/1
20 TABLET, FILM COATED ORAL NIGHTLY
Qty: 90 TABLET | Refills: 0 | Status: SHIPPED | OUTPATIENT
Start: 2024-04-17 | End: 2024-07-16

## 2024-04-17 RX ORDER — SEMAGLUTIDE 0.68 MG/ML
INJECTION, SOLUTION SUBCUTANEOUS
Qty: 3 ML | Refills: 0 | Status: SHIPPED | OUTPATIENT
Start: 2024-04-17 | End: 2024-06-01

## 2024-04-19 ENCOUNTER — TELEPHONE (OUTPATIENT)
Dept: ENDOCRINOLOGY CLINIC | Facility: CLINIC | Age: 54
End: 2024-04-19

## 2024-04-19 NOTE — TELEPHONE ENCOUNTER
Current Outpatient Medications   Medication Sig Dispense Refill    Phentermine HCl 37.5 MG Oral Tab Take 0.5 tablets (18.75 mg total) by mouth every morning before breakfast. 15 tablet 0     Key # [id number]

## 2024-04-20 NOTE — TELEPHONE ENCOUNTER
Medication PA Requested:    Phentermine 37.5mg oral tab                                                      CoverMyMeds Used:  Key:  Quantity: 15  Day Supply: 30  Sig:   Take 0.5 tablets (18.75 mg total) by mouth every morning before breakfast.   DX Code:    E66.01

## 2024-04-22 ENCOUNTER — TELEPHONE (OUTPATIENT)
Dept: ENDOCRINOLOGY CLINIC | Facility: CLINIC | Age: 54
End: 2024-04-22

## 2024-04-22 DIAGNOSIS — E29.1 HYPOGONADISM IN MALE: Primary | ICD-10-CM

## 2024-04-22 NOTE — TELEPHONE ENCOUNTER
Medication PA Requested:    Phentermine 37.5mg oral tab                                                      CoverMyMeds Used: No  Key:  Quantity: 15  Day Supply: 30  Sig:   Take 0.5 tablets (18.75 mg total) by mouth every morning before breakfast.   DX Code:    E66.01          EPA submitted, LOV 4/16  Awaiting determination

## 2024-04-24 NOTE — TELEPHONE ENCOUNTER
Received fax from [facility] in regards to Phentermine. Medication has been approved from 03/23/2024 to 10/19/2024. [employer] message sent to pt and approval letter sent to scanning.

## 2024-04-26 NOTE — TELEPHONE ENCOUNTER
Spoke with patient and he needs testosterone Rx sent to the pharmacy. Pended testosterone, needles, and syringes.     Dr. Sullivan, can you send in on behalf of Dr. Matta?

## 2024-04-27 RX ORDER — SYRINGE W-NEEDLE,DISPOSAB,3 ML 25GX5/8"
SYRINGE, EMPTY DISPOSABLE MISCELLANEOUS
Qty: 50 EACH | Refills: 0 | Status: SHIPPED | OUTPATIENT
Start: 2024-04-27

## 2024-04-27 RX ORDER — TESTOSTERONE CYPIONATE 200 MG/ML
150 INJECTION, SOLUTION INTRAMUSCULAR
Qty: 2 ML | Refills: 1 | Status: SHIPPED | OUTPATIENT
Start: 2024-04-27

## 2024-05-06 ENCOUNTER — TELEPHONE (OUTPATIENT)
Dept: ENDOCRINOLOGY CLINIC | Facility: CLINIC | Age: 54
End: 2024-05-06

## 2024-05-06 NOTE — TELEPHONE ENCOUNTER
Patient calling for status on PRIOR AUTHORIZATION for Testosterone.  He states that insurance should have sent a fax regarding the this.  Please call    See 4/22/24 telephone encounter

## 2024-05-07 ENCOUNTER — NURSE ONLY (OUTPATIENT)
Dept: ENDOCRINOLOGY CLINIC | Facility: CLINIC | Age: 54
End: 2024-05-07

## 2024-05-07 DIAGNOSIS — E29.1 HYPOGONADISM IN MALE: Primary | ICD-10-CM

## 2024-05-07 PROCEDURE — 96372 THER/PROPH/DIAG INJ SC/IM: CPT | Performed by: INTERNAL MEDICINE

## 2024-05-07 RX ORDER — TESTOSTERONE CYPIONATE 200 MG/ML
150 INJECTION, SOLUTION INTRAMUSCULAR ONCE
Status: COMPLETED | OUTPATIENT
Start: 2024-05-07 | End: 2024-05-07

## 2024-05-07 RX ADMIN — TESTOSTERONE CYPIONATE 150 MG: 200 INJECTION, SOLUTION INTRAMUSCULAR at 15:17:00

## 2024-05-07 NOTE — TELEPHONE ENCOUNTER
Patient calling with spouse on speaker.  Patient states that he spoke with insurance and was informed that MD office needs to send the Authorization to the pharmacy.      Patient wife states that this process has been going on since he left office for his last OV.  He stresses that he doesn't understand what is delaying office from getting approval.      Patient is currently behind 2 injections.  He is requesting to know if he can schedule a NV appointment in [place] to have the injection.      Patient is requesting to be called back today.

## 2024-05-07 NOTE — TELEPHONE ENCOUNTER
Medication PA Requested:  testosterone cypionate 200 mg/mL Intramuscular Solution                                                         CoverMyMeds Used:  Key:  Quantity: 2mL  Day Supply: 30  Sig: Inject 0.75 mL (150 mg total) into the muscle every 14 (fourteen) days.   DX Code:    E29.1

## 2024-05-07 NOTE — TELEPHONE ENCOUNTER
Medication PA Requested:  testosterone cypionate 200 mg/mL Intramuscular Solution                                                         CoverMyMeds Used: No  Key:  Quantity: 2mL  Day Supply: 30  Sig: Inject 0.75 mL (150 mg total) into the muscle every 14 (fourteen) days.   DX Code:    E29.1                                         Electronic Prior Authorization submitted, Last office visit notes 4/16/24 and labs 4/9/24 and 6/7/23.  Awaiting determination

## 2024-05-07 NOTE — TELEPHONE ENCOUNTER
Called pt and notified that PA has been submitted and once we get a response, pt will be notified.   Pt requesting to receive testosterone asap. Offered NV today at 2 pm for testosterone injection per Sabrina. Pt agreed and will come to appt.

## 2024-05-07 NOTE — PROGRESS NOTES
Patient presents to clinic for testosterone injection.   Last CBC level date: 04/09/2024  Last testosterone level date: 04/09/2024  Dose and MAR order verified by: Shelli KANG, patient wife administered injection per patient request.  Tolerated 150 mg injection to right deltoid.   Patient understands plan of care, patient wife will be administering the next injection in 2  weeks. Total of 10 minutes spent with patient administering injection and discussing plan of care.   50 mg waste medication disposed of in purple top waste medication bin per clinic policy.   Other task reminders given to the patient: Patient wife was educated on how to draw the injection, pick the correct site and administer the injection.

## 2024-05-09 NOTE — TELEPHONE ENCOUNTER
Testosterone cypionate approved from 4/7/24-11/3/24    "[employer] message sent to patient

## 2024-07-16 ENCOUNTER — OFFICE VISIT (OUTPATIENT)
Dept: ENDOCRINOLOGY CLINIC | Facility: CLINIC | Age: 54
End: 2024-07-16
Payer: COMMERCIAL

## 2024-07-16 VITALS
WEIGHT: 290 LBS | BODY MASS INDEX: 42.95 KG/M2 | HEART RATE: 72 BPM | HEIGHT: 69 IN | DIASTOLIC BLOOD PRESSURE: 65 MMHG | SYSTOLIC BLOOD PRESSURE: 104 MMHG

## 2024-07-16 DIAGNOSIS — E78.5 DYSLIPIDEMIA: ICD-10-CM

## 2024-07-16 DIAGNOSIS — E88.819 INSULIN RESISTANCE: ICD-10-CM

## 2024-07-16 DIAGNOSIS — E66.01 CLASS 3 SEVERE OBESITY DUE TO EXCESS CALORIES WITH SERIOUS COMORBIDITY AND BODY MASS INDEX (BMI) OF 40.0 TO 44.9 IN ADULT (HCC): ICD-10-CM

## 2024-07-16 DIAGNOSIS — N25.81 SECONDARY HYPERPARATHYROIDISM (HCC): ICD-10-CM

## 2024-07-16 DIAGNOSIS — E66.01 CLASS 3 SEVERE OBESITY DUE TO EXCESS CALORIES WITH SERIOUS COMORBIDITY AND BODY MASS INDEX (BMI) OF 40.0 TO 44.9 IN ADULT (HCC): Primary | ICD-10-CM

## 2024-07-16 DIAGNOSIS — R73.03 PRE-DIABETES: ICD-10-CM

## 2024-07-16 PROCEDURE — 99214 OFFICE O/P EST MOD 30 MIN: CPT | Performed by: INTERNAL MEDICINE

## 2024-07-16 PROCEDURE — 3074F SYST BP LT 130 MM HG: CPT | Performed by: INTERNAL MEDICINE

## 2024-07-16 PROCEDURE — 3078F DIAST BP <80 MM HG: CPT | Performed by: INTERNAL MEDICINE

## 2024-07-16 PROCEDURE — 3008F BODY MASS INDEX DOCD: CPT | Performed by: INTERNAL MEDICINE

## 2024-07-16 RX ORDER — SEMAGLUTIDE 1.34 MG/ML
1 INJECTION, SOLUTION SUBCUTANEOUS WEEKLY
Qty: 9 ML | Refills: 0 | Status: SHIPPED | OUTPATIENT
Start: 2024-07-16 | End: 2024-10-14

## 2024-07-16 NOTE — PROGRESS NOTES
Follow-up- Reason for Visit:  Hypogonadism.  Requesting Physician: Self-Referral.    CHIEF COMPLAINT:    Chief Complaint   Patient presents with    Hypogonadism    Thyroid Problem     Hyperparathyroidism         HISTORY OF PRESENT ILLNESS:   Srinivasa Andrade is a 54 year old male who presents for follow-up of hypogonadism.     At the last visit, we had started him on testosterone injections, as well as phentermine, and Ozempic and I had also asked him to increase calcium as well as vitamin D. He is here in follow up.     He is still feeling tired and states that a couple of days before his injection, he feels tired. He is not taking his phentermine that I prescribed, nor has he asked for refill for Ozempic after starting the lowest dose.     PAST MEDICAL HISTORY:   No past medical history on file.    PAST SURGICAL HISTORY:   No past surgical history on file.    SOCIAL HISTORY:    Social History     Socioeconomic History    Marital status:    Tobacco Use    Smoking status: Some Days     Types: Cigars   Vaping Use    Vaping status: Former       FAMILY HISTORY:   No family history on file.    CURRENT MEDICATIONS:    Current Outpatient Medications   Medication Sig Dispense Refill    testosterone cypionate 200 mg/mL Intramuscular Solution Inject 0.75 mL (150 mg total) into the muscle every 14 (fourteen) days. 2 mL 1    atorvastatin 20 MG Oral Tab Take 1 tablet (20 mg total) by mouth nightly. 90 tablet 0    Syringe 23G X 1\" 3 ML Does not apply Misc Use to inject medication every 14 days 50 each 0    Needle, Disp, 18G X 1\" Does not apply Misc Use to inject medication every 14 days 50 each 0    semaglutide (OZEMPIC, 0.25 OR 0.5 MG/DOSE,) 2 MG/3ML Subcutaneous Solution Pen-injector Inject 0.25 mg into the skin once a week for 30 days, THEN 0.5 mg once a week for 15 days. 3 mL 0       ALLERGIES:  No Known Allergies      ASSESSMENTS:     REVIEW OF SYSTEMS:  Constitutional: Negative for: weight change, fever, fatigue,  cold/heat intolerance  Eyes: Negative for:  Visual changes, proptosis, blurring  ENT: Negative for:  dysphagia, neck swelling, dysphonia  Respiratory: Negative for:  dyspnea, cough  Cardiovascular: Negative for:  chest pain, palpitations, orthopnea  GI: Negative for:  abdominal pain, nausea, vomiting, diarrhea, constipation, bleeding  Neurology: Negative for: headache, numbness, weakness  Genito-Urinary: Negative for: dysuria, frequency  Psychiatric: Negative for:  depression, anxiety  Hematology/Lymphatics: Negative for: bruising, lower extremity edema  Endocrine: Negative for: polyuria, polydypsia  Skin: Negative for: rash, blister, cellulitis,      PHYSICAL EXAM:   Vitals:    07/16/24 0849   BP: 104/65   Pulse: 72   Weight: 290 lb (131.5 kg)   Height: 5' 9\" (1.753 m)         BMI: Body mass index is 42.83 kg/m².     CONSTITUTIONAL:  awake, alert, cooperative, no apparent distress, and appears stated age  PSYCH: normal affect  EYES:  No proptosis, no ptosis, conjunctiva normal  ENT:  Normocephalic, atraumatic  NECK:  Supple, symmetrical, trachea midline, no adenopathy, thyroid symmetric, not enlarged and no tenderness  SKIN:  no bruising or bleeding, no rashes and no lesions, with mild acanthosis nigricans  EXTREMITIES: no edema      DATA:   07/08/24:  FBS- 102  Nestor Ca- 8.94  Creatinine- 1.10  Vitamin D- 36.2  HDL- 36  LDL- 120  TGL- 326  TSH- 3.190  hematocrit- 49.0  HbA1c- 6.0      Thyroid US- 3/13/24:  PROCEDURE: US THYROID (CPT= 92109)     COMPARISON: None.     INDICATIONS: H/O partial thyroidectomy     TECHNIQUE: High-resolution ultrasound was performed of the thyroid gland.     FINDINGS:  RIGHT LOBE: Right lobe measures 54 x 27 x 21 mm (14.3 mL).  Heterogeneous echotexture.  In the posterior lower pole there is a 10 x 8 x 8 mm solid hypoechoic nodule (TR 4).     LEFT LOBE: Post partial thyroidectomy.  Unremarkable left thyroid bed.     ISTHMUS: Isthmus measures 7 mm in anterior posterior diameter.   Heterogeneous echotexture without nodule.     OTHER: No masses or adenopathy.                 Impression   CONCLUSION:  1.  Post resection of the left lobe of the thyroid.  No suspicious soft tissue in the left thyroid bed.  2.  Heterogeneous appearance of the thyroid parenchyma compatible with a nonspecific thyroiditis.  3.  10 mm TIRADS 4 nodule in the right lobe.  Given its current size recommend follow-up ultrasound in 12 months.        Dictated by (CST): Jose Panchal MD on 3/13/2024 at 2:38 PM      Finalized by (CST): Jose Panchal MD on 3/13/2024 at 2:39 PM             ASSESSMENT AND PLAN: This is a 54 year-old man here for follow-up of hypogonadism of unknown cause, obesity, pre-diabetes, dyslipidemia and vitamin D deficiency.     1.) Hypogonadism   - Continue IM testosterone, 150mg every 2 weeks   - We will check testosterone, CBC with diff and PSA next week    2.) Vitamin Levels  - Continue Vitamin D  - Start taking Vitamin B12 500mcg daily    3.) Secondary Hyperparathyroidism  - Could be from low calcium  - He should start taking calcium citrate + D once a day  - We will check PTH now    4.) Dyslipidemia  - We changed rosuvastatin to atorvastatin   - Lipid Panel has improved    5.) Obesity and increased cravings and pre-diabetes  - Start phentermine  - Incraese Ozempic from 0.5 to 1mg qweekly  - Check HbA1c in 3 months    Return in 3 months    Prior to this encounter, I spent over 15 minutes with preparing for the visit, including reviewing documents from other specialties as well as from PCP and going over test results and imaging studies. During the face to face encounter, I spent an additional 15 minutes which were determined for follow-up. Greater than 50% of the time was spent in counseling, anticipatory guidance, and coordination of care. Patient concerns were answered to the best of my knowledge.         7/16/24  Marlen Matta MD

## 2024-07-17 NOTE — TELEPHONE ENCOUNTER
Endocrine Refill protocol for oral medications    Protocol Criteria:PASSED     -Appointment with Endocrinology completed in the last 6 months or scheduled in the next 3 months     Verify the above has been completed or scheduled in the appropriate timeline. If so can send a 90 day supply with 1 refill.     Last completed office visit: 7/16/2024 Marlen Matta MD   Next scheduled Follow up:   Future Appointments   Date Time Provider Department Center   1/14/2025  9:00 AM [facility]

## 2024-07-18 ENCOUNTER — TELEPHONE (OUTPATIENT)
Dept: ENDOCRINOLOGY CLINIC | Facility: CLINIC | Age: 54
End: 2024-07-18

## 2024-07-18 RX ORDER — PHENTERMINE HYDROCHLORIDE 37.5 MG/1
18.75 TABLET ORAL
Qty: 15 TABLET | Refills: 0 | Status: SHIPPED | OUTPATIENT
Start: 2024-07-18 | End: 2024-08-17

## 2024-07-18 NOTE — TELEPHONE ENCOUNTER
Medication PA Requested:   OZEMPIC, 1 MG/DOSE,) 4 MG/3ML Subcutaneous Solution Pen                                                        CoverMyMeds Used:  Key:  Quantity: 9 mL   Day Supply: 90  Sig:  Inject 1 mg into the skin once a week.   DX Code:  R73.03, E66.01

## 2024-07-18 NOTE — TELEPHONE ENCOUNTER
Patient requesting refills for Testosterone - was due to have injection yesterday but pharmacy doesn't have prescription.  Also following up on prescription for Phentermine.  Please call.  Thank you.    Current Outpatient Medications   Medication Sig Dispense Refill    testosterone cypionate 200 mg/mL Intramuscular Solution Inject 0.75 mL (150 mg total) into the muscle every 14 (fourteen) days. 2 mL 1

## 2024-07-18 NOTE — TELEPHONE ENCOUNTER
Current Outpatient Medications   Medication Sig Dispense Refill    semaglutide (OZEMPIC, 1 MG/DOSE,) 4 MG/3ML Subcutaneous Solution Pen-injector Inject 1 mg into the skin once a week. 9 mL 0     KEY: [id number]

## 2024-07-22 ENCOUNTER — LAB ENCOUNTER (OUTPATIENT)
Dept: LAB | Age: 54
End: 2024-07-22
Attending: INTERNAL MEDICINE
Payer: COMMERCIAL

## 2024-07-22 DIAGNOSIS — N25.81 SECONDARY HYPERPARATHYROIDISM (HCC): ICD-10-CM

## 2024-07-22 DIAGNOSIS — E29.1 HYPOGONADISM IN MALE: ICD-10-CM

## 2024-07-22 DIAGNOSIS — E53.8 VITAMIN B12 DEFICIENCY: ICD-10-CM

## 2024-07-22 DIAGNOSIS — Z86.39 HISTORY OF HYPOPARATHYROIDISM: ICD-10-CM

## 2024-07-22 DIAGNOSIS — E78.5 DYSLIPIDEMIA: ICD-10-CM

## 2024-07-22 LAB
ALBUMIN SERPL-MCNC: 5.1 G/DL (ref 3.2–4.8)
ALBUMIN/GLOB SERPL: 1.8 {RATIO} (ref 1–2)
ALP LIVER SERPL-CCNC: 57 U/L
ALT SERPL-CCNC: 33 U/L
ANION GAP SERPL CALC-SCNC: 4 MMOL/L (ref 0–18)
AST SERPL-CCNC: 38 U/L (ref ?–34)
BASOPHILS # BLD AUTO: 0.05 X10(3) UL (ref 0–0.2)
BASOPHILS NFR BLD AUTO: 0.5 %
BILIRUB SERPL-MCNC: 1 MG/DL (ref 0.3–1.2)
BUN BLD-MCNC: 15 MG/DL (ref 9–23)
BUN/CREAT SERPL: 12.6 (ref 10–20)
CALCIUM BLD-MCNC: 9.5 MG/DL (ref 8.7–10.4)
CHLORIDE SERPL-SCNC: 104 MMOL/L (ref 98–112)
CHOLEST SERPL-MCNC: 193 MG/DL (ref ?–200)
CO2 SERPL-SCNC: 34 MMOL/L (ref 21–32)
COMPLEXED PSA SERPL-MCNC: 1.74 NG/ML (ref ?–4)
CREAT BLD-MCNC: 1.19 MG/DL
DEPRECATED RDW RBC AUTO: 45.2 FL (ref 35.1–46.3)
EGFRCR SERPLBLD CKD-EPI 2021: 72 ML/MIN/1.73M2 (ref 60–?)
EOSINOPHIL # BLD AUTO: 0.19 X10(3) UL (ref 0–0.7)
EOSINOPHIL NFR BLD AUTO: 2.1 %
ERYTHROCYTE [DISTWIDTH] IN BLOOD BY AUTOMATED COUNT: 13.6 % (ref 11–15)
FASTING PATIENT LIPID ANSWER: YES
FASTING STATUS PATIENT QL REPORTED: YES
GLOBULIN PLAS-MCNC: 2.8 G/DL (ref 2–3.5)
GLUCOSE BLD-MCNC: 110 MG/DL (ref 70–99)
HCT VFR BLD AUTO: 48.8 %
HDLC SERPL-MCNC: 36 MG/DL (ref 40–59)
HGB BLD-MCNC: 17.6 G/DL
IMM GRANULOCYTES # BLD AUTO: 0.02 X10(3) UL (ref 0–1)
IMM GRANULOCYTES NFR BLD: 0.2 %
LDLC SERPL CALC-MCNC: 116 MG/DL (ref ?–100)
LYMPHOCYTES # BLD AUTO: 2.38 X10(3) UL (ref 1–4)
LYMPHOCYTES NFR BLD AUTO: 25.8 %
MCH RBC QN AUTO: 32.5 PG (ref 26–34)
MCHC RBC AUTO-ENTMCNC: 36.1 G/DL (ref 31–37)
MCV RBC AUTO: 90 FL
MONOCYTES # BLD AUTO: 0.63 X10(3) UL (ref 0.1–1)
MONOCYTES NFR BLD AUTO: 6.8 %
NEUTROPHILS # BLD AUTO: 5.97 X10 (3) UL (ref 1.5–7.7)
NEUTROPHILS # BLD AUTO: 5.97 X10(3) UL (ref 1.5–7.7)
NEUTROPHILS NFR BLD AUTO: 64.6 %
NONHDLC SERPL-MCNC: 157 MG/DL (ref ?–130)
OSMOLALITY SERPL CALC.SUM OF ELEC: 295 MOSM/KG (ref 275–295)
PLATELET # BLD AUTO: 371 10(3)UL (ref 150–450)
POTASSIUM SERPL-SCNC: 3.8 MMOL/L (ref 3.5–5.1)
PROT SERPL-MCNC: 7.9 G/DL (ref 5.7–8.2)
PTH-INTACT SERPL-MCNC: 104.9 PG/ML (ref 18.5–88)
RBC # BLD AUTO: 5.42 X10(6)UL
SODIUM SERPL-SCNC: 142 MMOL/L (ref 136–145)
TRIGL SERPL-MCNC: 231 MG/DL (ref 30–149)
VIT B12 SERPL-MCNC: 502 PG/ML (ref 211–911)
VIT D+METAB SERPL-MCNC: 46.7 NG/ML (ref 30–100)
VLDLC SERPL CALC-MCNC: 41 MG/DL (ref 0–30)
WBC # BLD AUTO: 9.2 X10(3) UL (ref 4–11)

## 2024-07-22 PROCEDURE — 80061 LIPID PANEL: CPT

## 2024-07-22 PROCEDURE — 36415 COLL VENOUS BLD VENIPUNCTURE: CPT

## 2024-07-22 PROCEDURE — 82607 VITAMIN B-12: CPT

## 2024-07-22 PROCEDURE — 84410 TESTOSTERONE BIOAVAILABLE: CPT

## 2024-07-22 PROCEDURE — 80053 COMPREHEN METABOLIC PANEL: CPT

## 2024-07-22 PROCEDURE — 83970 ASSAY OF PARATHORMONE: CPT

## 2024-07-22 PROCEDURE — 85025 COMPLETE CBC W/AUTO DIFF WBC: CPT

## 2024-07-22 PROCEDURE — 82306 VITAMIN D 25 HYDROXY: CPT

## 2024-07-22 NOTE — TELEPHONE ENCOUNTER
Medication PA Requested:   OZEMPIC, 1 MG/DOSE,) 4 MG/3ML Subcutaneous Solution Pen                                                        CoverMyMeds Used: No  Key:  Quantity: 9 mL   Day Supply: 90  Sig:  Inject 1 mg into the skin once a week.   DX Code:  R73.03, E66.01         Electronic prior authorization submitted, last office visit 7/16 and A1C 4/9  Awaiting determination

## 2024-07-24 NOTE — TELEPHONE ENCOUNTER
Received fax from [facility] in regards to Ozempic. Medication has been denied because \" the use of this medication for any diagnosis other than type 2 diabetes mellitus is considered an experimental or investigational use for this drug or product\". [employer] message sent to pt, denial letter placed in denial folder, and message routed to provider.

## 2024-07-25 LAB
SEX HORM BIND GLOB: 26.6 NMOL/L
TESTOST % FREE+WEAK BND: 15.4 %
TESTOST FREE+WEAK BND: 25.8 NG/DL
TESTOSTERONE TOT /MS: 167.4 NG/DL

## 2024-07-31 ENCOUNTER — TELEPHONE (OUTPATIENT)
Dept: ENDOCRINOLOGY CLINIC | Facility: CLINIC | Age: 54
End: 2024-07-31

## 2024-07-31 ENCOUNTER — MOBILE ENCOUNTER (OUTPATIENT)
Dept: ENDOCRINOLOGY CLINIC | Facility: CLINIC | Age: 54
End: 2024-07-31

## 2024-07-31 DIAGNOSIS — E66.01 CLASS 3 SEVERE OBESITY DUE TO EXCESS CALORIES WITH SERIOUS COMORBIDITY AND BODY MASS INDEX (BMI) OF 40.0 TO 44.9 IN ADULT (HCC): Primary | ICD-10-CM

## 2024-07-31 DIAGNOSIS — E29.1 HYPOGONADISM IN MALE: ICD-10-CM

## 2024-07-31 DIAGNOSIS — N25.81 SECONDARY HYPERPARATHYROIDISM (HCC): ICD-10-CM

## 2024-07-31 DIAGNOSIS — E83.51 HYPOCALCEMIA: ICD-10-CM

## 2024-07-31 DIAGNOSIS — E55.9 VITAMIN D DEFICIENCY: ICD-10-CM

## 2024-07-31 RX ORDER — TESTOSTERONE CYPIONATE 200 MG/ML
150 INJECTION, SOLUTION INTRAMUSCULAR
Qty: 2 ML | Refills: 0 | Status: SHIPPED | OUTPATIENT
Start: 2024-07-31 | End: 2024-08-04

## 2024-07-31 NOTE — TELEPHONE ENCOUNTER
Patient awaiting script for Gel and also asking about prior authorization needed for Ozempic. Patient says [facility] has reached out to our office three times. Please update patient [phone],thanks.   *see closed telephone encounter 7/18

## 2024-07-31 NOTE — TELEPHONE ENCOUNTER
Called pt and notified that Ozempic was denied.   Pt requesting script for Testosterone injection, not gel.   Original script failed to send to pharmacy.     Please review pended script and sign if appropriate.

## 2024-08-04 RX ORDER — TIRZEPATIDE 5 MG/.5ML
5 INJECTION, SOLUTION SUBCUTANEOUS WEEKLY
Qty: 6 ML | Refills: 0 | Status: SHIPPED | OUTPATIENT
Start: 2024-08-04 | End: 2024-08-05

## 2024-08-04 RX ORDER — TESTOSTERONE CYPIONATE 200 MG/ML
200 INJECTION, SOLUTION INTRAMUSCULAR
Qty: 6 ML | Refills: 1 | Status: SHIPPED | OUTPATIENT
Start: 2024-08-04

## 2024-08-05 RX ORDER — SEMAGLUTIDE 0.25 MG/.5ML
0.25 INJECTION, SOLUTION SUBCUTANEOUS WEEKLY
Qty: 4 EACH | Refills: 0 | Status: SHIPPED | OUTPATIENT
Start: 2024-08-05

## 2024-08-05 NOTE — TELEPHONE ENCOUNTER
Dr. Matta,  Spoke to pharmacist - zepbound needs PA  Per [facility]:      Please advise if wegovy will be sent in place of zepbound    Spoke to patient and wife to relay message below - patient and wife stated understanding to increase testosterone to 200mg every 14 days - patient asking when he should repeat labs    Patient stated he takes 1 tablet (650mg calcium) of Citracal:          Please advise on switching to wegovy, when to repeat testosterone labs and calcium intake -thanks

## 2024-08-05 NOTE — TELEPHONE ENCOUNTER
Hi!  Please let patient know that I have sent Wegovy and to let us know in 3 weeks if doing well, then I will increase dose. Ask them to have blood work completed in 3 months in the morning between injections, fasting. Increase calcium to two pills daily. Thank you!

## 2024-08-05 NOTE — TELEPHONE ENCOUNTER
LM to call clinic to relay message below  MC sent with message below - patient advised to contact clinic with questions    PA completed for wegovy

## 2024-08-05 NOTE — TELEPHONE ENCOUNTER
Hi!  Please let patient know that after reviewing his test results, I have increased his testosterone dose from 150mg to 200mg every 2 weeks. I would also like to increase the amount of calcium that he is consuming. Please ask him how much calcium he uis taking everyday.     I have also tried to prescribe Zepbound for him. Thank you!

## 2024-08-13 DIAGNOSIS — E66.01 CLASS 3 SEVERE OBESITY DUE TO EXCESS CALORIES WITH SERIOUS COMORBIDITY AND BODY MASS INDEX (BMI) OF 40.0 TO 44.9 IN ADULT (HCC): ICD-10-CM

## 2024-08-13 DIAGNOSIS — E88.819 INSULIN RESISTANCE: ICD-10-CM

## 2024-08-13 NOTE — TELEPHONE ENCOUNTER
Patient called to request a medication refill for the PHENTERMINE. Please send the medication to the [facility] pharmacy in [place].         PHENTERMINE HCL 37.5 MG Oral Tab, TAKE 0.5 TABLETS (18.75 MG TOTAL) BY MOUTH EVERY MORNING BEFORE BREAKFAST., Disp: 15 tablet, Rfl: 0

## 2024-08-21 RX ORDER — PHENTERMINE HYDROCHLORIDE 37.5 MG/1
18.75 TABLET ORAL
Qty: 15 TABLET | Refills: 0 | Status: SHIPPED | OUTPATIENT
Start: 2024-08-21 | End: 2024-09-20

## 2024-09-03 DIAGNOSIS — E66.01 CLASS 3 SEVERE OBESITY DUE TO EXCESS CALORIES WITH SERIOUS COMORBIDITY AND BODY MASS INDEX (BMI) OF 40.0 TO 44.9 IN ADULT (HCC): ICD-10-CM

## 2024-09-03 DIAGNOSIS — E88.819 INSULIN RESISTANCE: ICD-10-CM

## 2024-09-03 NOTE — TELEPHONE ENCOUNTER
Endocrine Refill protocol for oral medications    Protocol Criteria:  PASSED  Reason: N/A    -Appointment with Endocrinology completed in the last 6 months or scheduled in the next 3 months     Verify the above has been completed or scheduled in the appropriate timeline. If so can send a 90 day supply with 1 refill.     Last completed office visit: 7/16/2024 Marlen Matta MD   Next scheduled Follow up:   Future Appointments   Date Time Provider Department Center   1/14/2025  9:00 AM [facility]

## 2024-09-05 RX ORDER — PHENTERMINE HYDROCHLORIDE 37.5 MG/1
37.5 TABLET ORAL
Qty: 30 TABLET | Refills: 0 | Status: SHIPPED | OUTPATIENT
Start: 2024-09-05 | End: 2024-10-05

## 2024-09-05 RX ORDER — SEMAGLUTIDE 0.25 MG/.5ML
0.25 INJECTION, SOLUTION SUBCUTANEOUS WEEKLY
Qty: 4 EACH | Refills: 0 | Status: SHIPPED | OUTPATIENT
Start: 2024-09-05

## 2024-10-07 DIAGNOSIS — E88.819 INSULIN RESISTANCE: Primary | ICD-10-CM

## 2024-10-07 DIAGNOSIS — E66.01 CLASS 3 SEVERE OBESITY DUE TO EXCESS CALORIES WITH SERIOUS COMORBIDITY AND BODY MASS INDEX (BMI) OF 40.0 TO 44.9 IN ADULT (HCC): ICD-10-CM

## 2024-10-07 DIAGNOSIS — E66.813 CLASS 3 SEVERE OBESITY DUE TO EXCESS CALORIES WITH SERIOUS COMORBIDITY AND BODY MASS INDEX (BMI) OF 40.0 TO 44.9 IN ADULT (HCC): ICD-10-CM

## 2024-10-07 DIAGNOSIS — R73.03 PRE-DIABETES: ICD-10-CM

## 2024-10-07 RX ORDER — SEMAGLUTIDE 2.4 MG/.75ML
2.4 INJECTION, SOLUTION SUBCUTANEOUS WEEKLY
Qty: 4 EACH | Refills: 11 | Status: SHIPPED | OUTPATIENT
Start: 2024-10-07

## 2024-10-07 RX ORDER — SEMAGLUTIDE 1.7 MG/.75ML
1.7 INJECTION, SOLUTION SUBCUTANEOUS WEEKLY
Qty: 4 EACH | Refills: 1 | Status: SHIPPED | OUTPATIENT
Start: 2024-10-07

## 2024-10-07 RX ORDER — SEMAGLUTIDE 0.25 MG/.5ML
0.25 INJECTION, SOLUTION SUBCUTANEOUS WEEKLY
Qty: 4 EACH | Refills: 0 | Status: CANCELLED | OUTPATIENT
Start: 2024-10-07

## 2024-10-07 RX ORDER — SEMAGLUTIDE 0.25 MG/.5ML
0.25 INJECTION, SOLUTION SUBCUTANEOUS WEEKLY
Qty: 4 EACH | Refills: 0 | OUTPATIENT
Start: 2024-10-07

## 2024-10-07 RX ORDER — SEMAGLUTIDE 1 MG/.5ML
1 INJECTION, SOLUTION SUBCUTANEOUS WEEKLY
Qty: 4 EACH | Refills: 1 | Status: SHIPPED | OUTPATIENT
Start: 2024-10-07

## 2024-10-07 RX ORDER — SEMAGLUTIDE 0.5 MG/.5ML
0.5 INJECTION, SOLUTION SUBCUTANEOUS WEEKLY
Qty: 4 EACH | Refills: 1 | Status: SHIPPED | OUTPATIENT
Start: 2024-10-07

## 2024-10-07 NOTE — TELEPHONE ENCOUNTER
Endocrine Refill protocol for oral medications / injectable    Protocol Criteria:  PASSED      If below requirement is met, send a 90-day supply with 1 refill per provider protocol.    Verify appointment with Endocrinology completed in the last 6 months or scheduled in the next 3 months.    Last completed office visit: 7/16/2024 Marlen Matta MD   Next scheduled Follow up:   Future Appointments   Date Time Provider Department Center   1/14/2025  9:00 AM Marlen Matta MD [facility]

## 2024-10-20 DIAGNOSIS — E66.01 CLASS 3 SEVERE OBESITY DUE TO EXCESS CALORIES WITH SERIOUS COMORBIDITY AND BODY MASS INDEX (BMI) OF 40.0 TO 44.9 IN ADULT (HCC): ICD-10-CM

## 2024-10-20 DIAGNOSIS — E66.813 CLASS 3 SEVERE OBESITY DUE TO EXCESS CALORIES WITH SERIOUS COMORBIDITY AND BODY MASS INDEX (BMI) OF 40.0 TO 44.9 IN ADULT (HCC): ICD-10-CM

## 2024-10-20 DIAGNOSIS — E29.1 HYPOGONADISM IN MALE: ICD-10-CM

## 2024-10-20 DIAGNOSIS — E88.819 INSULIN RESISTANCE: ICD-10-CM

## 2024-10-21 RX ORDER — SYRINGE W-NEEDLE,DISPOSAB,3 ML 25GX5/8"
SYRINGE, EMPTY DISPOSABLE MISCELLANEOUS
Qty: 50 EACH | Refills: 1 | Status: SHIPPED | OUTPATIENT
Start: 2024-10-21

## 2024-10-21 NOTE — TELEPHONE ENCOUNTER
Endocrine Refill protocol for Glucose testing supplies     Protocol Criteria: PASSED Reason: N/A    If below requirement is met, send a 90-day supply with 1 refill per provider protocol.    Verify appointment with Endocrinology completed in the last 6 months or scheduled in the next 3 months.    Last completed office visit: 7/16/2024 Marlen Matta MD   Next scheduled Follow up:   Future Appointments   Date Time Provider Department Center   1/14/2025  9:00 AM [facility]

## 2024-10-21 NOTE — TELEPHONE ENCOUNTER
Endocrine Refill protocol for oral medications    Protocol Criteria:  PASSED  Reason: N/A    If below requirement is met, send a 90-day supply with 1 refill per provider protocol.    Verify appointment with Endocrinology completed in the last 6 months or scheduled in the next 3 months.    Last completed office visit: 7/16/2024 Marlen Matta MD   Next scheduled Follow up:   Future Appointments   Date Time Provider Department Center   1/14/2025  9:00 AM Marlen Matta MD [facility]

## 2024-10-23 RX ORDER — PHENTERMINE HYDROCHLORIDE 37.5 MG/1
37.5 TABLET ORAL
Qty: 30 TABLET | Refills: 0 | Status: SHIPPED | OUTPATIENT
Start: 2024-10-23

## 2024-10-24 ENCOUNTER — TELEPHONE (OUTPATIENT)
Dept: ENDOCRINOLOGY CLINIC | Facility: CLINIC | Age: 54
End: 2024-10-24

## 2024-10-24 NOTE — TELEPHONE ENCOUNTER
PHENTERMINE HCL 37.5 MG Oral Tab, TAKE 1 TABLET BY MOUTH EVERY MORNING BEFORE BREAKFAST, Disp: 30 tablet, Rfl: 0  Key:[id number]

## 2024-10-25 DIAGNOSIS — E88.819 INSULIN RESISTANCE: ICD-10-CM

## 2024-10-25 DIAGNOSIS — E66.813 CLASS 3 SEVERE OBESITY DUE TO EXCESS CALORIES WITH SERIOUS COMORBIDITY AND BODY MASS INDEX (BMI) OF 40.0 TO 44.9 IN ADULT (HCC): ICD-10-CM

## 2024-10-25 DIAGNOSIS — E66.01 CLASS 3 SEVERE OBESITY DUE TO EXCESS CALORIES WITH SERIOUS COMORBIDITY AND BODY MASS INDEX (BMI) OF 40.0 TO 44.9 IN ADULT (HCC): ICD-10-CM

## 2024-10-25 NOTE — TELEPHONE ENCOUNTER
Received fax from [facility] in regards to Phentermine. Medication has been denied due the use of this medication in combination with another Prior authorization medication for weight loss does not establish medical necessity for this drug.    Placed in denial folder.

## 2024-10-25 NOTE — TELEPHONE ENCOUNTER
Endocrine Refill protocol for oral medications    Protocol Criteria:  PASSED  Reason: N/A    If below requirement is met, send a 90-day supply with 1 refill per provider protocol.    Verify appointment with Endocrinology completed in the last 6 months or scheduled in the next 3 months.    Last completed office visit: 7/16/2024 Marlen Matta MD   Next scheduled Follow up:   Future Appointments   Date Time Provider Department Center   1/14/2025  9:00 AM Marlen Matta MD [facility]

## 2024-11-01 ENCOUNTER — PATIENT MESSAGE (OUTPATIENT)
Dept: ENDOCRINOLOGY CLINIC | Facility: CLINIC | Age: 54
End: 2024-11-01

## 2024-11-03 RX ORDER — PHENTERMINE HYDROCHLORIDE 37.5 MG/1
37.5 TABLET ORAL
Qty: 30 TABLET | Refills: 0 | Status: SHIPPED | OUTPATIENT
Start: 2024-11-03

## 2024-11-04 NOTE — TELEPHONE ENCOUNTER
Per LOV note with Dr. Matta:    5.) Obesity and increased cravings and pre-diabetes  - Start phentermine  - Incraese Ozempic from 0.5 to 1mg qweekly  - Check HbA1c in 3 months    See message sent to the patient. Will await response.

## 2024-11-14 ENCOUNTER — PATIENT MESSAGE (OUTPATIENT)
Dept: ENDOCRINOLOGY CLINIC | Facility: CLINIC | Age: 54
End: 2024-11-14

## 2024-11-14 NOTE — TELEPHONE ENCOUNTER
Records reviewed from [facility] under care Everywhere.   Pt was seen for early SBO, BP was also elevated during hospitalization.     Advised pt to continue to hold wegovy and phentermine until provider gives recommendation.

## 2024-11-14 NOTE — TELEPHONE ENCOUNTER
Consultation - Geriatric Medicine   Ashley Eastman 87 y.o. female MRN: [id number]  Unit/Bed#: [facility] 412-01 Encounter: [id number]      Assessment & Plan     Severe symptomatic stenosis  -s/p TAVR 7/25/24  -currently on amlodipine, ASA, atorvastatin, plavix  -acute post op pain well controlled  -post op CXR reports no acute cardiopulmonary disease   -post-op echo pending   -Cardiology on consult     Hyponatremia  -[Na] 131  -mild and on review of past labs appears acute   -monitor closely and avoid rapid and drastic fluctuations     Cognitive screening  -alert and oriented, denies memory or cognitive concerns  -reports independence with ADLs and iADLs at baseline   -no prior cognitive testing on record for review   -no recent CTH or MRI brain for review   -TSH WNL 1.633, no recent B12 on record, consider checking with routine labs   -at risk age and cardiovascular cognitive decline, continue secondary risk factor modifications  -Encourage patient remain physically, socially, cognitively active and engaged to maintain cognitive acuity    Insomnia  -related to hospitalization  -consider addition of low dose melatonin HS PRN for use during hospitalization   -encourage sleep promoting environment and group cares as medically appropriate     Hx colon cancer  -s/p resection 8/2023  -follows with Surg-Onc, continue close o/p f/u for ongoing management     Impaired mastication  -Requires use of dentures  -ensure meal consistency appropriate for abilities  -continue aspiration precautions    Deconditioning/debility/frailty   -Clinical frailty scale stage V, mildly frail, progressive  -Multifactorial including age, hypertension, history of colon cancer s/p resection and multiple additional chronic medical comorbidities now admitted with severe symptomatic AS s/p TAVR  -Continue optimization of chronic medical conditions and address acute metabolic derangements as arise  -Encourage well-balanced additional intake, consider  Please see mychart 11/14/24   supplements such as boost or Ensure between meals if oral intake is poor  -Monitor for treat any underlying anxiety/mood/depression symptoms as may impact patient response to therapies as well as overall sense of wellbeing and quality of life  -Continue to ensure that treatments and interventions align with patient's wishes and goals of care  -Continue psychosocial supports of patient and caregivers    Delirium precautions  -Patient is high risk of delirium due to age, surgical procedure, anesthesia, hospitalization, hx encephalopathy during prior hospitalizations   -Initiate delirium precautions  -maintain normal sleep/wake cycle  -dim lights, close blinds and turn off tv to minimize stimulation and encourage sleep environment in evenings  -ensure that pain is well controlled    -monitor for fecal and urinary retention which may precipitate delirium  -encourage early mobilization and ambulation with assist once cleared to safely do so  -provide frequent reorientation and redirection as indicated and appropriate  -encourage family at the bedside for familiarity and reassurance  -Minimize use of medications which may precipitate or worsen delirium such as tramadol, benzodiazepine, anticholinergics, and benadryl as possible  -encourage hydration and nutrition   -redirect unwanted behaviors as first line    Home medication review     Amlodipine 5 Mg at bedtime  Atorvastatin 10 Mg daily    Care coordination: rounded with nursing on unit     History of Present Illness   Physician Requesting Consult: Julio Cesar Draper MD  Reason for Consult / Principal Problem: Geriatric post op eval  Hx and PE limited by: N/A  Additional history obtained from: Chart review and patient evaluation    HPI: Ashley Eastman is a 87 y.o. year old female with HTN, hx of colon cancer s/p resection, MVR, HLD and severe symptomatic aortic stenosis who is admitted to Cardiothoracic surgery for TAVR which she underwent 7/25/24. She repots pain has  been well controlled since the procedure and she is feeling well, she is motivated for a prompt recovery and reports family is visiting from out of town for additional support while she recovers from her procedure.    Prior to admission Ashley was residing home alone and reports independence with ADLs and iADLs, she denies memory or cognitive concerns. She reports no use of assist device for ambulation at baseline or history of falls. She denies use of hearing aid or glasses, wears dentures.     Inpatient consult to Gerontology  Consult performed by: Maeve Schofield DO  Consult ordered by: Bertha Melendez PA-C        Review of Systems   Constitutional: Negative.  Negative for appetite change, chills and fever.   HENT: Negative.     Eyes: Negative.    Respiratory: Negative.  Negative for shortness of breath.    Cardiovascular: Negative.  Negative for chest pain and palpitations.   Gastrointestinal: Negative.  Negative for abdominal pain.   Genitourinary: Negative.    Musculoskeletal: Negative.  Negative for gait problem.   Skin: Negative.    Neurological: Negative.  Negative for numbness.   Hematological: Negative.    Psychiatric/Behavioral: Negative.  Negative for sleep disturbance.    All other systems reviewed and are negative.    Historical Information   Past Medical History:   Diagnosis Date    Aortic valve regurgitation     Aortic valve stenosis     Elevated alkaline phosphatase level 06/16/2023    Hypertension     Left ventricular hypertrophy     Mitral valve regurgitation     Osteoarthritis     Pure hypercholesterolemia     Shingles outbreak     back and left side     Past Surgical History:   Procedure Laterality Date    BUNIONECTOMY Left 04/16/2014    CARDIAC CATHETERIZATION Left 7/9/2024    Procedure: Cardiac Left Heart Cath;  Surgeon: Fred Berger DO;  Location: [facility];  Service: Cardiology    CARDIAC CATHETERIZATION N/A 7/9/2024    Procedure: Cardiac Coronary Angiogram;  Surgeon:  Fred Berger DO;  Location: [facility];  Service: Cardiology    CARDIAC CATHETERIZATION N/A 2024    Procedure: Cardiac catheterization;  Surgeon: Fred Berger DO;  Location: [facility];  Service: Cardiology    COLONOSCOPY  2023    CORRECTION HAMMER TOE Left 2014    CT COLECTOMY PARTIAL W/ANASTOMOSIS N/A 08/15/2023    Procedure: RIGHT HEMICOLECTOMY;  Surgeon: Rosario Rios MD;  Location: [facility];  Service: Surgical Oncology     Social History   Social History     Substance and Sexual Activity   Alcohol Use Not Currently     Social History     Substance and Sexual Activity   Drug Use Not Currently     Social History     Tobacco Use   Smoking Status Former    Current packs/day: 0.00    Average packs/day: 1 pack/day for 25.0 years (25.0 ttl pk-yrs)    Types: Cigarettes    Start date:     Quit date:     Years since quittin.5    Passive exposure: Past   Smokeless Tobacco Never     Family History:   Family History   Problem Relation Age of Onset    No Known Problems Mother     Coronary artery disease Father     Throat cancer Father     Diabetes Sister     Cancer Sister     Pancreatic cancer Sister 60    No Known Problems Daughter     No Known Problems Daughter     No Known Problems Maternal Grandmother     No Known Problems Maternal Grandfather     No Known Problems Paternal Grandmother     No Known Problems Paternal Grandfather     No Known Problems Brother     Cancer Brother     No Known Problems Son     No Known Problems Son     No Known Problems Son      Meds/Allergies   all current active meds have been reviewed    Allergies   Allergen Reactions    Acetaminophen Hives     Objective     Intake/Output Summary (Last 24 hours) at 2024 0633  Last data filed at 2024 0614  Gross per 24 hour   Intake 1381.42 ml   Output 1700 ml   Net -318.58 ml     Invasive Devices       Central Venous Catheter Line  Duration             CVC Central Lines  07/25/24 <1 day              Peripheral Intravenous Line  Duration             Peripheral IV 07/25/24 Proximal;Right;Ventral (anterior) Forearm <1 day    Peripheral IV 07/25/24 Right Antecubital <1 day              Arterial Line  Duration             Arterial Line 07/25/24 Left Radial <1 day              Line  Duration             Venous Sheath 6 Fr. Right Femoral <1 day              Drain  Duration             Urethral Catheter Non-latex;Straight-tip 16 Fr. <1 day                  Physical Exam  Vitals and nursing note reviewed.   Constitutional:       General: She is not in acute distress.     Appearance: Normal appearance. She is not toxic-appearing.   HENT:      Head: Normocephalic.      Nose: Nose normal.      Mouth/Throat:      Mouth: Mucous membranes are dry.   Eyes:      General:         Right eye: No discharge.         Left eye: No discharge.      Conjunctiva/sclera: Conjunctivae normal.   Neck:      Comments: Trachea midline, phonation normal  Cardiovascular:      Rate and Rhythm: Normal rate.   Pulmonary:      Effort: Pulmonary effort is normal. No respiratory distress.      Breath sounds: No wheezing.   Abdominal:      General: There is no distension.      Palpations: Abdomen is soft.      Tenderness: There is no abdominal tenderness.   Musculoskeletal:      Cervical back: Neck supple.      Right lower leg: No edema.      Left lower leg: No edema.      Comments: Mildly reduced overall muscle mass    Skin:     General: Skin is warm and dry.   Neurological:      Mental Status: She is alert.      Comments: Awake and alert, answers questions appropriately   Psychiatric:         Mood and Affect: Mood normal.         Behavior: Behavior normal.       Lab Results:     I have personally reviewed pertinent lab results including the following:    Results from last 7 days   Lab Units 07/26/24  0429 07/25/24  1025 07/25/24  0955   WBC Thousand/uL 15.63*  --   --    HEMOGLOBIN g/dL 12.1 11.3*  --    I STAT HEMOGLOBIN  g/dl  --   --  9.9*   HEMATOCRIT % 35.3 33.0*  --    HEMATOCRIT, ISTAT %  --   --  29*   PLATELETS Thousands/uL 158 169  --      Results from last 7 days   Lab Units 07/26/24  0429 07/25/24  1025 07/25/24  0955 07/25/24  0928   POTASSIUM mmol/L 3.5 3.7  --   --    CHLORIDE mmol/L 99 105  --   --    CO2 mmol/L 23 21  --   --    CO2, I-STAT mmol/L  --   --  25 25   BUN mg/dL 13 12  --   --    CREATININE mg/dL 0.59* 0.73  --   --    CALCIUM mg/dL 8.4 8.2*  --   --    GLUCOSE, ISTAT mg/dl  --   --  117 101     I have personally reviewed the following imaging study reports in PACS:    7/25/24- CXR    Therapies:   PT: pending   OT: pending     VTE Prophylaxis: Heparin    Code Status: Level 1 - Full Code  Advance Directive and Living Will:      Power of :    POLST:      Family and Social Support: daughter     Goals of Care: return home asap

## 2024-11-21 ENCOUNTER — TELEPHONE (OUTPATIENT)
Dept: ENDOCRINOLOGY CLINIC | Facility: CLINIC | Age: 54
End: 2024-11-21

## 2024-11-21 NOTE — TELEPHONE ENCOUNTER
Medication Quantity Refills Start End   testosterone cypionate 200 mg/mL Intramuscular Solution 6 mL 1 8/4/2024 --   Sig:   Inject 1 mL (200 mg total) into the muscle every 14 (fourteen) days.     Route:   Intramuscular         PRIOR AUTH REQUESTED VIA [employer]    MILES: [id number]

## 2024-11-22 NOTE — TELEPHONE ENCOUNTER
Submitted prior auth for testosterone via [email or web address]. Awaiting response from insurance    Case ID:  [id number]  Reference ID:  [id number]

## 2024-11-22 NOTE — TELEPHONE ENCOUNTER
Received fax from [facility] in regards to Testosterone. Medication has been approved from 10/23/224 to 11/22/25. DEMANDITt message sent to pt and approval letter sent to scanning.

## 2024-11-22 NOTE — TELEPHONE ENCOUNTER
Medication PA Requested:  testosterone cypionate 200 mg/mL Intramuscular Solution                                                        CoverMyMeds Used:  Key: [id number]  Quantity: 6 ml   Refill:1  Day Supply:90 day   Sig:   Inject 1 mL (200 mg total) into the muscle every 14 (fourteen) days.   DX Code: E29.1                                  CPT code (if applicable):   Case Number/Pending Ref#:

## 2024-11-25 NOTE — TELEPHONE ENCOUNTER
MC sent updating patient on testosterone approval - per chart review: RX for testosterone sent on 8/4/24 has 1 refill

## 2024-12-19 NOTE — TELEPHONE ENCOUNTER
Hi     Please contact patient and see how he is doing currently.  Also ask him if he has had a follow-up appointment with his GI doctor and general surgeon.  Based upon his current symptoms we can decide whether or not to start the phentermine and Wegovy.  Thank you.

## 2024-12-27 ENCOUNTER — LAB ENCOUNTER (OUTPATIENT)
Dept: LAB | Age: 54
End: 2024-12-27
Attending: INTERNAL MEDICINE
Payer: COMMERCIAL

## 2024-12-27 DIAGNOSIS — E55.9 VITAMIN D DEFICIENCY: ICD-10-CM

## 2024-12-27 DIAGNOSIS — E29.1 HYPOGONADISM IN MALE: ICD-10-CM

## 2024-12-27 DIAGNOSIS — E83.51 HYPOCALCEMIA: ICD-10-CM

## 2024-12-27 DIAGNOSIS — N25.81 SECONDARY HYPERPARATHYROIDISM (HCC): ICD-10-CM

## 2024-12-27 LAB
ALBUMIN SERPL-MCNC: 4.7 G/DL (ref 3.2–4.8)
ALBUMIN/GLOB SERPL: 1.6 {RATIO} (ref 1–2)
ALP LIVER SERPL-CCNC: 50 U/L
ALT SERPL-CCNC: 22 U/L
ANION GAP SERPL CALC-SCNC: 7 MMOL/L (ref 0–18)
AST SERPL-CCNC: 19 U/L (ref ?–34)
BASOPHILS # BLD AUTO: 0.07 X10(3) UL (ref 0–0.2)
BASOPHILS NFR BLD AUTO: 0.6 %
BILIRUB SERPL-MCNC: 0.4 MG/DL (ref 0.3–1.2)
BUN BLD-MCNC: 14 MG/DL (ref 9–23)
BUN/CREAT SERPL: 13.7 (ref 10–20)
CALCIUM BLD-MCNC: 9.9 MG/DL (ref 8.7–10.4)
CHLORIDE SERPL-SCNC: 106 MMOL/L (ref 98–112)
CO2 SERPL-SCNC: 31 MMOL/L (ref 21–32)
CREAT BLD-MCNC: 1.02 MG/DL
DEPRECATED RDW RBC AUTO: 50.1 FL (ref 35.1–46.3)
EGFRCR SERPLBLD CKD-EPI 2021: 86 ML/MIN/1.73M2 (ref 60–?)
EOSINOPHIL # BLD AUTO: 0.18 X10(3) UL (ref 0–0.7)
EOSINOPHIL NFR BLD AUTO: 1.6 %
ERYTHROCYTE [DISTWIDTH] IN BLOOD BY AUTOMATED COUNT: 14.5 % (ref 11–15)
FASTING STATUS PATIENT QL REPORTED: YES
GLOBULIN PLAS-MCNC: 2.9 G/DL (ref 2–3.5)
GLUCOSE BLD-MCNC: 94 MG/DL (ref 70–99)
HCT VFR BLD AUTO: 49.7 %
HGB BLD-MCNC: 16.7 G/DL
IMM GRANULOCYTES # BLD AUTO: 0.03 X10(3) UL (ref 0–1)
IMM GRANULOCYTES NFR BLD: 0.3 %
LYMPHOCYTES # BLD AUTO: 3.4 X10(3) UL (ref 1–4)
LYMPHOCYTES NFR BLD AUTO: 30.6 %
MCH RBC QN AUTO: 31.4 PG (ref 26–34)
MCHC RBC AUTO-ENTMCNC: 33.6 G/DL (ref 31–37)
MCV RBC AUTO: 93.4 FL
MONOCYTES # BLD AUTO: 0.72 X10(3) UL (ref 0.1–1)
MONOCYTES NFR BLD AUTO: 6.5 %
NEUTROPHILS # BLD AUTO: 6.72 X10 (3) UL (ref 1.5–7.7)
NEUTROPHILS # BLD AUTO: 6.72 X10(3) UL (ref 1.5–7.7)
NEUTROPHILS NFR BLD AUTO: 60.4 %
OSMOLALITY SERPL CALC.SUM OF ELEC: 298 MOSM/KG (ref 275–295)
PLATELET # BLD AUTO: 397 10(3)UL (ref 150–450)
PLATELETS.RETICULATED NFR BLD AUTO: 6 % (ref 0–7)
POTASSIUM SERPL-SCNC: 4.4 MMOL/L (ref 3.5–5.1)
PROT SERPL-MCNC: 7.6 G/DL (ref 5.7–8.2)
PTH-INTACT SERPL-MCNC: 57.1 PG/ML (ref 18.5–88)
RBC # BLD AUTO: 5.32 X10(6)UL
SODIUM SERPL-SCNC: 144 MMOL/L (ref 136–145)
VIT D+METAB SERPL-MCNC: 50.9 NG/ML (ref 30–100)
WBC # BLD AUTO: 11.1 X10(3) UL (ref 4–11)

## 2024-12-27 PROCEDURE — 82306 VITAMIN D 25 HYDROXY: CPT

## 2024-12-27 PROCEDURE — 85025 COMPLETE CBC W/AUTO DIFF WBC: CPT

## 2024-12-27 PROCEDURE — 83970 ASSAY OF PARATHORMONE: CPT

## 2024-12-27 PROCEDURE — 80053 COMPREHEN METABOLIC PANEL: CPT

## 2024-12-27 PROCEDURE — 36415 COLL VENOUS BLD VENIPUNCTURE: CPT

## 2024-12-27 PROCEDURE — 84410 TESTOSTERONE BIOAVAILABLE: CPT

## 2024-12-31 LAB
SEX HORM BIND GLOB: 21.9 NMOL/L
TESTOST % FREE+WEAK BND: 43 %
TESTOST FREE+WEAK BND: 341.7 NG/DL
TESTOSTERONE TOT /MS: 794.7 NG/DL

## 2025-01-14 ENCOUNTER — OFFICE VISIT (OUTPATIENT)
Dept: ENDOCRINOLOGY CLINIC | Facility: CLINIC | Age: 55
End: 2025-01-14
Payer: COMMERCIAL

## 2025-01-14 VITALS
BODY MASS INDEX: 39 KG/M2 | SYSTOLIC BLOOD PRESSURE: 114 MMHG | WEIGHT: 264 LBS | HEART RATE: 76 BPM | DIASTOLIC BLOOD PRESSURE: 72 MMHG

## 2025-01-14 DIAGNOSIS — E89.0 H/O PARTIAL THYROIDECTOMY: ICD-10-CM

## 2025-01-14 DIAGNOSIS — N25.81 SECONDARY HYPERPARATHYROIDISM (HCC): ICD-10-CM

## 2025-01-14 DIAGNOSIS — E66.812 CLASS 2 SEVERE OBESITY DUE TO EXCESS CALORIES WITH SERIOUS COMORBIDITY AND BODY MASS INDEX (BMI) OF 38.0 TO 38.9 IN ADULT (HCC): Primary | ICD-10-CM

## 2025-01-14 DIAGNOSIS — E29.1 HYPOGONADISM IN MALE: ICD-10-CM

## 2025-01-14 DIAGNOSIS — E88.819 INSULIN RESISTANCE: ICD-10-CM

## 2025-01-14 DIAGNOSIS — E78.5 DYSLIPIDEMIA: ICD-10-CM

## 2025-01-14 DIAGNOSIS — R73.03 PRE-DIABETES: ICD-10-CM

## 2025-01-14 DIAGNOSIS — E66.01 CLASS 2 SEVERE OBESITY DUE TO EXCESS CALORIES WITH SERIOUS COMORBIDITY AND BODY MASS INDEX (BMI) OF 38.0 TO 38.9 IN ADULT (HCC): Primary | ICD-10-CM

## 2025-01-14 PROCEDURE — 3074F SYST BP LT 130 MM HG: CPT | Performed by: INTERNAL MEDICINE

## 2025-01-14 PROCEDURE — 99214 OFFICE O/P EST MOD 30 MIN: CPT | Performed by: INTERNAL MEDICINE

## 2025-01-14 PROCEDURE — 3078F DIAST BP <80 MM HG: CPT | Performed by: INTERNAL MEDICINE

## 2025-01-14 RX ORDER — PHENTERMINE HYDROCHLORIDE 37.5 MG/1
37.5 TABLET ORAL
Qty: 30 TABLET | Refills: 1 | Status: SHIPPED | OUTPATIENT
Start: 2025-01-14

## 2025-01-14 RX ORDER — SEMAGLUTIDE 0.5 MG/.5ML
0.5 INJECTION, SOLUTION SUBCUTANEOUS WEEKLY
Qty: 4 EACH | Refills: 2 | Status: SHIPPED | OUTPATIENT
Start: 2025-01-14

## 2025-01-14 NOTE — PROGRESS NOTES
Follow-up- Reason for Visit:  Hypogonadism.  Requesting Physician: Self-Referral.    CHIEF COMPLAINT:    No chief complaint on file.       HISTORY OF PRESENT ILLNESS:   Srinivasa Andrade is a 54 year old male who presents for follow-up of hypogonadism.     A few visits ago, we had started him on testosterone injections, as well as phentermine, and Ozempic and I had also asked him to increase calcium as well as vitamin D. He is here in follow up.     His insurance did not want to cover the Ozempic and so we switched to Wegovy. He had been doing well on these, and recently, had to be hospitalized for bowel obstruction.     He was cleared by his surgeon on 12/11/24 for use of the Wegovy and phentermine. He is also doing well on the Testosterone. He takes 200mg every 2 weeks.     PAST MEDICAL HISTORY:   No past medical history on file.    PAST SURGICAL HISTORY:   No past surgical history on file.    SOCIAL HISTORY:    Social History     Socioeconomic History    Marital status:    Tobacco Use    Smoking status: Some Days     Types: Cigars   Vaping Use    Vaping status: Former     Social Drivers of Health      Received from [facility]    Housing Stability       FAMILY HISTORY:   No family history on file.    CURRENT MEDICATIONS:    Current Outpatient Medications   Medication Sig Dispense Refill    Phentermine HCl 37.5 MG Oral Tab Take 1 tablet (37.5 mg total) by mouth before breakfast. 30 tablet 0    Syringe 23G X 1\" 3 ML Does not apply Misc Use to inject medication every 14 days 50 each 1    Needle, Disp, 18G X 1\" Does not apply Misc Use to inject medication every 14 days 50 each 1    semaglutide-weight management (WEGOVY) 0.5 MG/0.5ML Subcutaneous Solution Auto-injector Inject 0.5 mL (0.5 mg total) into the skin once a week. 4 each 1    semaglutide-weight management (WEGOVY) 1 MG/0.5ML Subcutaneous Solution Auto-injector Inject 0.5 mL (1 mg total) into the skin once a week. 4 each 1     semaglutide-weight management (WEGOVY) 1.7 MG/0.75ML Subcutaneous Solution Auto-injector Inject 0.75 mL (1.7 mg total) into the skin once a week. 4 each 1    semaglutide-weight management (WEGOVY) 2.4 MG/0.75ML Subcutaneous Solution Auto-injector Inject 0.75 mL (2.4 mg total) into the skin once a week. 4 each 11    semaglutide-weight management (WEGOVY) 0.25 MG/0.5ML Subcutaneous Solution Auto-injector Inject 0.5 mL (0.25 mg total) into the skin once a week. 4 each 0    testosterone cypionate 200 mg/mL Intramuscular Solution Inject 1 mL (200 mg total) into the muscle every 14 (fourteen) days. 6 mL 1       ALLERGIES:  No Known Allergies      ASSESSMENTS:     REVIEW OF SYSTEMS:  Constitutional: Negative for: weight change, fever, fatigue, cold/heat intolerance  Eyes: Negative for:  Visual changes, proptosis, blurring  ENT: Negative for:  dysphagia, neck swelling, dysphonia  Respiratory: Negative for:  dyspnea, cough  Cardiovascular: Negative for:  chest pain, palpitations, orthopnea  GI: Negative for:  abdominal pain, nausea, vomiting, diarrhea, constipation, bleeding  Neurology: Negative for: headache, numbness, weakness  Genito-Urinary: Negative for: dysuria, frequency  Psychiatric: Negative for:  depression, anxiety  Hematology/Lymphatics: Negative for: bruising, lower extremity edema  Endocrine: Negative for: polyuria, polydypsia  Skin: Negative for: rash, blister, cellulitis,      PHYSICAL EXAM:   There were no vitals filed for this visit.        BMI: There is no height or weight on file to calculate BMI.     CONSTITUTIONAL:  awake, alert, cooperative, no apparent distress, and appears stated age  PSYCH: normal affect  EYES:  No proptosis, no ptosis, conjunctiva normal  ENT:  Normocephalic, atraumatic  NECK:  Supple, symmetrical, trachea midline, no adenopathy, thyroid symmetric, not enlarged and no tenderness  SKIN:  no bruising or bleeding, no rashes and no lesions, with mild acanthosis nigricans  EXTREMITIES:  no edema      DATA:   Reviewed      Thyroid US- 3/13/24:  PROCEDURE: US THYROID (CPT= 34642)     COMPARISON: None.     INDICATIONS: H/O partial thyroidectomy     TECHNIQUE: High-resolution ultrasound was performed of the thyroid gland.     FINDINGS:  RIGHT LOBE: Right lobe measures 54 x 27 x 21 mm (14.3 mL).  Heterogeneous echotexture.  In the posterior lower pole there is a 10 x 8 x 8 mm solid hypoechoic nodule (TR 4).     LEFT LOBE: Post partial thyroidectomy.  Unremarkable left thyroid bed.     ISTHMUS: Isthmus measures 7 mm in anterior posterior diameter.  Heterogeneous echotexture without nodule.     OTHER: No masses or adenopathy.                 Impression   CONCLUSION:  1.  Post resection of the left lobe of the thyroid.  No suspicious soft tissue in the left thyroid bed.  2.  Heterogeneous appearance of the thyroid parenchyma compatible with a nonspecific thyroiditis.  3.  10 mm TIRADS 4 nodule in the right lobe.  Given its current size recommend follow-up ultrasound in 12 months.        Dictated by (CST): Jose Panchal MD on 3/13/2024 at 2:38 PM      Finalized by (CST): Jose Panchal MD on 3/13/2024 at 2:39 PM             ASSESSMENT AND PLAN: This is a 54 year-old man here for follow-up of hypogonadism of unknown cause, obesity, pre-diabetes, dyslipidemia and vitamin D deficiency.     1.) Hypogonadism   - Continue IM testosterone, 200mg every 2 weeks   - We will check testosterone, CBC with diff in 3 months    2.) Vitamin Levels  - Continue Vitamin D  - Start taking Vitamin B12 500mcg daily    3.) Secondary Hyperparathyroidism  - Could be from low calcium  - PTH and calcium levels are within normal limits now    4.) Dyslipidemia  - We changed rosuvastatin to atorvastatin   - Lipid Panel has improved    5.) Obesity and increased cravings and pre-diabetes  - Continue phentermine  - Restart Wegovy at 0.5mg qweekly for 3 months       Return in 3 months    Prior to this encounter, I spent over 15 minutes with  preparing for the visit, including reviewing documents from other specialties as well as from PCP and going over test results and imaging studies. During the face to face encounter, I spent an additional 15 minutes which were determined for follow-up. Greater than 50% of the time was spent in counseling, anticipatory guidance, and coordination of care. Patient concerns were answered to the best of my knowledge.         1/14/25  Marlen Matta MD

## 2025-01-15 ENCOUNTER — TELEPHONE (OUTPATIENT)
Dept: ENDOCRINOLOGY CLINIC | Facility: CLINIC | Age: 55
End: 2025-01-15

## 2025-01-15 NOTE — TELEPHONE ENCOUNTER
Current Outpatient Medications   Medication Sig Dispense Refill      4 each 2    Phentermine HCl 37.5 MG Oral Tab Take 1 tablet (37.5 mg total) by mouth every morning before breakfast. 30 tablet 1                  Key:[id number]

## 2025-01-16 NOTE — TELEPHONE ENCOUNTER
Medication PA Requested: Phentermine HCl 37.5 MG Oral Tab                                                          CoverMyMeds Used: yes  Key:[id number]   Quantity: 30 tablets  Day Supply: 30  Sig: Take 1 tablet (37.5 mg total) by mouth every morning before breakfast.   DX Code: E66.812,E66.01,Z68.38

## 2025-01-17 NOTE — TELEPHONE ENCOUNTER
PA has been denied.     Denied    Note from payer: Your PA request has been denied.  Additional information will be provided in the denial communication. (Message 1141)  Payer: KANA Chan Case ID: [id number]    [phone]  Electronic appeal: Not supported  Appeal instructions: Your PA request has been denied.  Additional information will be provided in the denial communication. (Message 114)    Endo MA team please let us know once denial fax has been received. Thanks.

## 2025-01-17 NOTE — TELEPHONE ENCOUNTER
Nayeli fax received and reviewed. Phentermine PA was denied because no evidence was submitted that the patient has participated in a comprehensive weight management program. This is required for coverage of the drug.     Dr. Matta, do you know if the patient has participated in a weight management program? If so, can the last office note be addended and we can file appeal? Thank you.     JAN mcgraw placed in red folder for holding.

## 2025-01-20 NOTE — TELEPHONE ENCOUNTER
Current Outpatient Medications   Medication Sig Dispense Refill           Phentermine HCl 37.5 MG Oral Tab Take 1 tablet (37.5 mg total) by mouth every morning before breakfast. 30 tablet 1   ARELI: [id number]

## 2025-01-22 DIAGNOSIS — E66.812 CLASS 2 SEVERE OBESITY DUE TO EXCESS CALORIES WITH SERIOUS COMORBIDITY AND BODY MASS INDEX (BMI) OF 38.0 TO 38.9 IN ADULT (HCC): ICD-10-CM

## 2025-01-22 DIAGNOSIS — E66.01 CLASS 2 SEVERE OBESITY DUE TO EXCESS CALORIES WITH SERIOUS COMORBIDITY AND BODY MASS INDEX (BMI) OF 38.0 TO 38.9 IN ADULT (HCC): ICD-10-CM

## 2025-01-22 RX ORDER — SEMAGLUTIDE 0.5 MG/.5ML
0.5 INJECTION, SOLUTION SUBCUTANEOUS WEEKLY
Qty: 4 EACH | Refills: 2 | Status: CANCELLED | OUTPATIENT
Start: 2025-01-22

## 2025-01-23 NOTE — TELEPHONE ENCOUNTER
Per chart review: RX for wegovy 0.5mg sent on 1/14/25 was sent with 2 refills     Spoke to pharmacist: wegovy goes through insurance but has copay >$700 - PA needed     PA completed via [employer]:  Case Id  [id number]  Reference Id  [id number]     sent updating patient

## 2025-01-24 NOTE — TELEPHONE ENCOUNTER
Per Kg for wegovy 0.5mg:  This is to inform you that your Prior Authorization request for the above member’s Wegovy has been approved. If you are changing the member's therapy the previously approved therapy will be  canceled and replaced.  The authorization is valid from 12/25/2024 through 07/23/2025. A letter of explanation will also be  mailed to the patient.    MC sent updating patient

## 2025-02-20 DIAGNOSIS — E29.1 HYPOGONADISM IN MALE: ICD-10-CM

## 2025-02-20 NOTE — TELEPHONE ENCOUNTER
Endocrine Refill protocol for oral medications    Protocol Criteria:  PASSED  Reason: N/A    If below requirement is met, send a 90-day supply with 1 refill per provider protocol.    Verify appointment with Endocrinology completed in the last 6 months or scheduled in the next 3 months.    Last completed office visit: 1/14/2025 Marlen Matta MD   Next scheduled Follow up:   Future Appointments   Date Time Provider Department Center   4/9/2025 10:20 AM Marlen Matta MD [facility]

## 2025-02-25 DIAGNOSIS — E29.1 HYPOGONADISM IN MALE: ICD-10-CM

## 2025-02-25 RX ORDER — TESTOSTERONE CYPIONATE 200 MG/ML
200 INJECTION, SOLUTION INTRAMUSCULAR
Qty: 6 ML | Refills: 1 | Status: SHIPPED | OUTPATIENT
Start: 2025-02-25

## 2025-02-25 NOTE — TELEPHONE ENCOUNTER
LOV: 01/14/2025  Follow-Up: Return in 3 Months  Appointment: 04/09/2025  Monthly Supply: 30 Days Supply

## 2025-03-12 RX ORDER — TESTOSTERONE CYPIONATE 200 MG/ML
200 INJECTION, SOLUTION INTRAMUSCULAR
Qty: 6 ML | Refills: 0 | Status: SHIPPED
Start: 2025-03-12

## 2025-04-28 DIAGNOSIS — E66.01 CLASS 2 SEVERE OBESITY DUE TO EXCESS CALORIES WITH SERIOUS COMORBIDITY AND BODY MASS INDEX (BMI) OF 38.0 TO 38.9 IN ADULT (HCC): ICD-10-CM

## 2025-04-28 DIAGNOSIS — E66.812 CLASS 2 SEVERE OBESITY DUE TO EXCESS CALORIES WITH SERIOUS COMORBIDITY AND BODY MASS INDEX (BMI) OF 38.0 TO 38.9 IN ADULT (HCC): ICD-10-CM

## 2025-04-28 RX ORDER — SEMAGLUTIDE 0.5 MG/.5ML
0.5 INJECTION, SOLUTION SUBCUTANEOUS WEEKLY
Qty: 4 EACH | Refills: 2 | Status: CANCELLED | OUTPATIENT
Start: 2025-04-28

## 2025-05-01 RX ORDER — SEMAGLUTIDE 0.5 MG/.5ML
0.5 INJECTION, SOLUTION SUBCUTANEOUS WEEKLY
Refills: 0 | OUTPATIENT
Start: 2025-05-01

## 2025-05-01 RX ORDER — SEMAGLUTIDE 1 MG/.5ML
1 INJECTION, SOLUTION SUBCUTANEOUS WEEKLY
Qty: 4 EACH | Refills: 0 | Status: SHIPPED | OUTPATIENT
Start: 2025-05-01

## 2025-05-29 DIAGNOSIS — E66.01 CLASS 2 SEVERE OBESITY DUE TO EXCESS CALORIES WITH SERIOUS COMORBIDITY AND BODY MASS INDEX (BMI) OF 38.0 TO 38.9 IN ADULT (HCC): ICD-10-CM

## 2025-05-29 DIAGNOSIS — E66.812 CLASS 2 SEVERE OBESITY DUE TO EXCESS CALORIES WITH SERIOUS COMORBIDITY AND BODY MASS INDEX (BMI) OF 38.0 TO 38.9 IN ADULT (HCC): ICD-10-CM

## 2025-05-29 RX ORDER — PHENTERMINE HYDROCHLORIDE 37.5 MG/1
37.5 TABLET ORAL
Qty: 30 TABLET | Refills: 1 | Status: SHIPPED | OUTPATIENT
Start: 2025-05-29

## 2025-05-29 NOTE — TELEPHONE ENCOUNTER
Endocrine Refill protocol for weight management    Protocol Criteria:  PASSED Reason: N/A    If below requirement is met, send a 90-day supply with 1 refill per provider protocol.    Verify appointment with Endocrinology completed in the last 6 months or scheduled in the next 3 months.    Last completed office visit:1/14/2025 Marlen Matta MD   Next scheduled Follow up:   Future Appointments   Date Time Provider Department Center   7/30/2025 10:20 AM Marlen Matta MD [facility]

## 2025-08-14 DIAGNOSIS — E29.1 HYPOGONADISM IN MALE: ICD-10-CM

## 2025-08-15 RX ORDER — TESTOSTERONE CYPIONATE 200 MG/ML
200 INJECTION, SOLUTION INTRAMUSCULAR
Qty: 6 ML | Refills: 1 | Status: SHIPPED | OUTPATIENT
Start: 2025-08-15